# Patient Record
Sex: MALE | Race: WHITE | NOT HISPANIC OR LATINO | Employment: OTHER | URBAN - METROPOLITAN AREA
[De-identification: names, ages, dates, MRNs, and addresses within clinical notes are randomized per-mention and may not be internally consistent; named-entity substitution may affect disease eponyms.]

---

## 2017-08-28 ENCOUNTER — HOSPITAL ENCOUNTER (OUTPATIENT)
Dept: OTHER | Facility: HOSPITAL | Age: 42
Setting detail: SPECIMEN
Discharge: HOME | End: 2017-08-28
Attending: UROLOGY | Admitting: UROLOGY

## 2017-08-29 LAB — SPECIMEN SOURCE: NORMAL

## 2019-03-07 ENCOUNTER — HOSPITAL ENCOUNTER (OUTPATIENT)
Dept: OTHER | Facility: HOSPITAL | Age: 44
Setting detail: SPECIMEN
Discharge: HOME | End: 2019-03-07
Attending: UROLOGY | Admitting: UROLOGY

## 2019-03-08 LAB — SPECIMEN SOURCE: NORMAL

## 2019-08-05 ENCOUNTER — TRANSCRIBE ORDERS (OUTPATIENT)
Dept: ADMINISTRATIVE | Facility: HOSPITAL | Age: 44
End: 2019-08-05

## 2019-08-05 DIAGNOSIS — N63.0 BREAST NODULE: Primary | ICD-10-CM

## 2019-08-09 ENCOUNTER — HOSPITAL ENCOUNTER (OUTPATIENT)
Dept: ULTRASOUND IMAGING | Facility: HOSPITAL | Age: 44
Discharge: HOME | End: 2019-08-09

## 2019-08-09 ENCOUNTER — HOSPITAL ENCOUNTER (OUTPATIENT)
Dept: MAMMOGRAPHY | Facility: HOSPITAL | Age: 44
Discharge: HOME | End: 2019-08-09
Admitting: FAMILY MEDICINE

## 2019-08-09 DIAGNOSIS — N63.0 BREAST NODULE: ICD-10-CM

## 2019-08-09 PROCEDURE — 76642 ULTRASOUND BREAST LIMITED: CPT

## 2019-08-09 PROCEDURE — 77066 DX MAMMO INCL CAD BI: CPT

## 2019-08-13 ENCOUNTER — TELEPHONE (OUTPATIENT)
Dept: SURGERY | Facility: CLINIC | Age: 44
End: 2019-08-13

## 2020-03-03 ENCOUNTER — CONSULT (OUTPATIENT)
Dept: ONCOLOGY | Facility: CLINIC | Age: 45
End: 2020-03-03

## 2020-03-03 ENCOUNTER — APPOINTMENT (OUTPATIENT)
Dept: LAB | Facility: HOSPITAL | Age: 45
End: 2020-03-03

## 2020-03-03 VITALS
HEART RATE: 80 BPM | RESPIRATION RATE: 16 BRPM | DIASTOLIC BLOOD PRESSURE: 60 MMHG | WEIGHT: 174.8 LBS | SYSTOLIC BLOOD PRESSURE: 92 MMHG | HEIGHT: 69 IN | OXYGEN SATURATION: 98 % | BODY MASS INDEX: 25.89 KG/M2 | TEMPERATURE: 98.3 F

## 2020-03-03 DIAGNOSIS — D64.9 ANEMIA, UNSPECIFIED TYPE: ICD-10-CM

## 2020-03-03 DIAGNOSIS — K74.60 HEPATIC CIRRHOSIS, UNSPECIFIED HEPATIC CIRRHOSIS TYPE, UNSPECIFIED WHETHER ASCITES PRESENT (HCC): ICD-10-CM

## 2020-03-03 DIAGNOSIS — D69.6 THROMBOCYTOPENIA (HCC): Primary | ICD-10-CM

## 2020-03-03 LAB
ALBUMIN SERPL-MCNC: 4.2 G/DL (ref 3.5–5.2)
ALBUMIN/GLOB SERPL: 1.5 G/DL (ref 1.1–2.4)
ALP SERPL-CCNC: 115 U/L (ref 38–116)
ALT SERPL W P-5'-P-CCNC: 32 U/L (ref 0–41)
ANION GAP SERPL CALCULATED.3IONS-SCNC: 15.3 MMOL/L (ref 5–15)
AST SERPL-CCNC: 35 U/L (ref 0–40)
BASOPHILS # BLD AUTO: 0.01 10*3/MM3 (ref 0–0.2)
BASOPHILS NFR BLD AUTO: 0.2 % (ref 0–1.5)
BILIRUB SERPL-MCNC: 1.2 MG/DL (ref 0.2–1.2)
BUN BLD-MCNC: 10 MG/DL (ref 6–20)
BUN/CREAT SERPL: 10.5 (ref 7.3–30)
CALCIUM SPEC-SCNC: 8.8 MG/DL (ref 8.5–10.2)
CHLORIDE SERPL-SCNC: 100 MMOL/L (ref 98–107)
CO2 SERPL-SCNC: 26.7 MMOL/L (ref 22–29)
CREAT BLD-MCNC: 0.95 MG/DL (ref 0.7–1.3)
DEPRECATED RDW RBC AUTO: 55.2 FL (ref 37–54)
EOSINOPHIL # BLD AUTO: 0.1 10*3/MM3 (ref 0–0.4)
EOSINOPHIL NFR BLD AUTO: 2 % (ref 0.3–6.2)
ERYTHROCYTE [DISTWIDTH] IN BLOOD BY AUTOMATED COUNT: 14.3 % (ref 12.3–15.4)
FERRITIN SERPL-MCNC: 52.3 NG/ML (ref 30–400)
FOLATE SERPL-MCNC: 19.2 NG/ML (ref 4.78–24.2)
GFR SERPL CREATININE-BSD FRML MDRD: 86 ML/MIN/1.73
GLOBULIN UR ELPH-MCNC: 2.8 GM/DL (ref 1.8–3.5)
GLUCOSE BLD-MCNC: 111 MG/DL (ref 74–124)
HAPTOGLOB SERPL-MCNC: 48 MG/DL (ref 30–200)
HCT VFR BLD AUTO: 41.7 % (ref 37.5–51)
HGB BLD-MCNC: 13.7 G/DL (ref 13–17.7)
IMM GRANULOCYTES # BLD AUTO: 0.02 10*3/MM3 (ref 0–0.05)
IMM GRANULOCYTES NFR BLD AUTO: 0.4 % (ref 0–0.5)
IRON 24H UR-MRATE: 152 MCG/DL (ref 59–158)
IRON SATN MFR SERPL: 37 % (ref 14–48)
LDH SERPL-CCNC: 178 U/L (ref 99–259)
LYMPHOCYTES # BLD AUTO: 0.59 10*3/MM3 (ref 0.7–3.1)
LYMPHOCYTES NFR BLD AUTO: 11.8 % (ref 19.6–45.3)
MCH RBC QN AUTO: 34.5 PG (ref 26.6–33)
MCHC RBC AUTO-ENTMCNC: 32.9 G/DL (ref 31.5–35.7)
MCV RBC AUTO: 105 FL (ref 79–97)
MONOCYTES # BLD AUTO: 0.68 10*3/MM3 (ref 0.1–0.9)
MONOCYTES NFR BLD AUTO: 13.6 % (ref 5–12)
NEUTROPHILS # BLD AUTO: 3.61 10*3/MM3 (ref 1.7–7)
NEUTROPHILS NFR BLD AUTO: 72 % (ref 42.7–76)
NRBC BLD AUTO-RTO: 0 /100 WBC (ref 0–0.2)
PLATELET # BLD AUTO: 69 10*3/MM3 (ref 140–450)
PLATELETS.RETICULATED NFR BLD AUTO: 5 % (ref 0.9–6.5)
PMV BLD AUTO: 10.1 FL (ref 6–12)
POTASSIUM BLD-SCNC: 3.4 MMOL/L (ref 3.5–4.7)
PROT SERPL-MCNC: 7 G/DL (ref 6.3–8)
RBC # BLD AUTO: 3.97 10*6/MM3 (ref 4.14–5.8)
SODIUM BLD-SCNC: 142 MMOL/L (ref 134–145)
TIBC SERPL-MCNC: 407 MCG/DL (ref 249–505)
TRANSFERRIN SERPL-MCNC: 291 MG/DL (ref 200–360)
VIT B12 BLD-MCNC: 671 PG/ML (ref 211–946)
WBC NRBC COR # BLD: 5.01 10*3/MM3 (ref 3.4–10.8)

## 2020-03-03 PROCEDURE — 82728 ASSAY OF FERRITIN: CPT | Performed by: INTERNAL MEDICINE

## 2020-03-03 PROCEDURE — 85025 COMPLETE CBC W/AUTO DIFF WBC: CPT | Performed by: INTERNAL MEDICINE

## 2020-03-03 PROCEDURE — 80053 COMPREHEN METABOLIC PANEL: CPT | Performed by: INTERNAL MEDICINE

## 2020-03-03 PROCEDURE — 82746 ASSAY OF FOLIC ACID SERUM: CPT | Performed by: INTERNAL MEDICINE

## 2020-03-03 PROCEDURE — 83010 ASSAY OF HAPTOGLOBIN QUANT: CPT | Performed by: INTERNAL MEDICINE

## 2020-03-03 PROCEDURE — 82607 VITAMIN B-12: CPT | Performed by: INTERNAL MEDICINE

## 2020-03-03 PROCEDURE — 99205 OFFICE O/P NEW HI 60 MIN: CPT | Performed by: INTERNAL MEDICINE

## 2020-03-03 PROCEDURE — 83615 LACTATE (LD) (LDH) ENZYME: CPT | Performed by: INTERNAL MEDICINE

## 2020-03-03 PROCEDURE — 85055 RETICULATED PLATELET ASSAY: CPT | Performed by: INTERNAL MEDICINE

## 2020-03-03 PROCEDURE — 84466 ASSAY OF TRANSFERRIN: CPT | Performed by: INTERNAL MEDICINE

## 2020-03-03 PROCEDURE — 83540 ASSAY OF IRON: CPT | Performed by: INTERNAL MEDICINE

## 2020-03-03 PROCEDURE — 36415 COLL VENOUS BLD VENIPUNCTURE: CPT | Performed by: INTERNAL MEDICINE

## 2020-03-03 RX ORDER — SPIRONOLACTONE 100 MG/1
100 TABLET, FILM COATED ORAL DAILY
COMMUNITY
Start: 2020-01-14

## 2020-03-03 RX ORDER — FUROSEMIDE 40 MG/1
40 TABLET ORAL DAILY
COMMUNITY
Start: 2020-02-19 | End: 2022-03-08 | Stop reason: DRUGHIGH

## 2020-03-03 RX ORDER — BUPRENORPHINE HYDROCHLORIDE AND NALOXONE HYDROCHLORIDE DIHYDRATE 8; 2 MG/1; MG/1
2 TABLET SUBLINGUAL DAILY
COMMUNITY
Start: 2019-06-06 | End: 2020-09-08

## 2020-03-04 LAB
IGA SERPL-MCNC: 360 MG/DL (ref 90–386)
IGG SERPL-MCNC: 1040 MG/DL (ref 700–1600)
IGM SERPL-MCNC: 115 MG/DL (ref 20–172)
PROT PATTERN SERPL IFE-IMP: NORMAL

## 2020-03-09 ENCOUNTER — APPOINTMENT (OUTPATIENT)
Dept: LAB | Facility: HOSPITAL | Age: 45
End: 2020-03-09

## 2020-03-09 ENCOUNTER — OFFICE VISIT (OUTPATIENT)
Dept: ONCOLOGY | Facility: CLINIC | Age: 45
End: 2020-03-09

## 2020-03-09 VITALS
HEART RATE: 77 BPM | RESPIRATION RATE: 16 BRPM | BODY MASS INDEX: 26.22 KG/M2 | SYSTOLIC BLOOD PRESSURE: 94 MMHG | OXYGEN SATURATION: 99 % | DIASTOLIC BLOOD PRESSURE: 69 MMHG | HEIGHT: 69 IN | WEIGHT: 177 LBS | TEMPERATURE: 98 F

## 2020-03-09 DIAGNOSIS — D69.6 THROMBOCYTOPENIA (HCC): Primary | ICD-10-CM

## 2020-03-09 DIAGNOSIS — K74.60 HEPATIC CIRRHOSIS, UNSPECIFIED HEPATIC CIRRHOSIS TYPE, UNSPECIFIED WHETHER ASCITES PRESENT (HCC): ICD-10-CM

## 2020-03-09 DIAGNOSIS — D64.9 ANEMIA, UNSPECIFIED TYPE: ICD-10-CM

## 2020-03-09 PROCEDURE — 99214 OFFICE O/P EST MOD 30 MIN: CPT | Performed by: INTERNAL MEDICINE

## 2020-03-09 NOTE — PROGRESS NOTES
"    .     REASON FOR CONSULTATION:     Thrombocytopenia secondary to liver cirrhosis.                                  HISTORY OF PRESENT ILLNESS:  The patient is a 44 y.o. male with history presented today for reevaluation, to discuss laboratory results obtained on 3/3/2020 for evaluation of his thrombocytopenia.  Patient reports that at baseline condition denies easy bleeding or bruising.  Patient continues to have chronic fatigue, poor appetite.  His abdomen discomfort.  Denies abdomen distention, no lower extremity edema.  Performance that is ECOG 1-0.    Laboratory study on 3/3/2020 reported platelets 69,000, IPF 5%, hemoglobin 13.7 .0 and WBC 5010 including ANC 3610 lymphocytes 590.  Other labs reported a normal , normal liver function panel, total protein 7.0 and albumin 4.2 globulin 2.8.  Normal renal function and glucose level.  Marginally low potassium 3.4 otherwise normal electrolytes.  Serum protein immunofixation was negative for monoclonal protein.  Vitamin B12 level 671 pg/mL, folate 19.2, ferritin 52.3 free iron 152 TIBC 407 and iron saturation 37%, haptoglobin 48.        Past Medical History:   Diagnosis Date   • Anxiety    • Breast asymmetry 08/2019    Right lump   • Esophageal varix (CMS/HCC)     Multiple bandings   • GERD (gastroesophageal reflux disease)    • History of cirrhosis     Stage III/Dr. Giraldo [facility]   • Hypotestosteronism    • Infectious viral hepatitis     C   • Jaundice    • Substance abuse (CMS/HCC)     H/O drug use, sees substance abuse doctor     Past Surgical History:   Procedure Laterality Date   • APPENDECTOMY     • BRAIN SURGERY      20 years ago \"drained fluid pocket\"?   • ESOPHAGUS SURGERY      Banding more than 10 times   • SHOULDER ARTHROSCOPY Right 1995    Rotator cuff     ONCOLOGIC/HEMATOLOGIC HISTORY: The patient is a 44 y.o. male with history as below who is here on 3/3/2020 for initial evaluation of thrombocytopenia.  The patient presents by himself " today.      Patient reports that he is aware of having low platelet counts for many years.      He has history of GI bleeding due to esophageal varices.  The patient had an EGD done by Dr. Nikkie Giraldo on 4/15/2019, which revealed grade I esophageal varices, but no evidence of malignancy.  He reports he has had about 10 esophageal banding procedures done, and his most recent esophageal banding was performed about 3 weeks ago at [facility].  He notes he has the esophageal banding procedure repeated about every 3 months.  He denies any recent melena or hematochezia.  He reports occasional abdominal distention and abdominal discomfort, but he denies any significant abdominal pain.  Patient reports that he had a previous ascites, but no paracentesis.  He was given diuretics, he also become more physically active and exercise in gym which made his situation better.    He reports easy bruising with blood draws, but otherwise denies abnormal bleeding or bruising.  He denies any active bleeding at this time.    The patient was diagnosed with cirrhosis of the liver about 10 years ago due to Hepatitis C from illicit drug use.  I reviewed the CT scan of the abdomen and pelvis from 2/29/2016, which demonstrated cirrhosis and splenomegaly 15 cm.   He notes that his Hepatitis C is cured with the treatment, and he is no longer using illicit drugs.  He denies any history of heavy alcohol use and denies any alcohol use at this time.    He denies any family history of cancer or blood disorders.  The patient currently works as a .    Per our records, the patient has had a low platelet count since 1/16/2012.  He was previously followed by hematology service Dr. Dye at the [facility] from 3/19/2012 for thrombocytopenia and liver cirrhosis.  It seems patient also had portal vein thrombosis and was on Coumadin anticoagulation at that time.  According to the clinic note, he had platelets 33,000 in 2010.    Patient had a  hospitalization at the [facility] in late August 2019 due to left femur fracture for which he had ORIF.  Laboratory study on 8/24/2019 reported a platelets 66,000, hemoglobin 13.0 MCV 98 and WBC 8600 including ANC 5970, lymphocytes 1310 and monocytes 1200.  His platelets dropped at 39,000 on 8/25/2019 with Hb 12.4, MCV 99, WBC 5100 and without differentiation.  Platelets improved at 47,000 on 8/26/2019 and hemoglobin 11.7 WBC 5100 without differentiation.    Outside laboratory study from 1/16/2013 reported hemoglobin 15.3, .1, platelets 96,000, ANC 4400, with WBC 7300.    Laboratory study on 8/9/2019 reported WBC 5900, ANC 4213, hemoglobin 13.6 hematocrit 40.5, .7 MCHC 33.6, and platelets 55,000.  Lymphocytes was 897, monocytes 696.  His vitamin B12 level was 601 pg/mL, negative PSA less than 0.1, TSH normal at 0.90, and vitamin D 25 ng/mL and normal lipid profile.  Chemistry lab reported normal creatinine 0.83 normal electrolytes, normal liver function panel but a slightly elevated alk phosphatase 142.    Laboratory studies today, 3/3/2020, show hemoglobin 13.7 .0 MCHC 32.9, platelets 69,000 IPF 5%, WBC 5010 including ANC 3610 lymphocytes 1530 lymphocytes 590 and monocytes 680.       MEDICATIONS    Current Outpatient Medications:   •  buprenorphine-naloxone (SUBOXONE) 8-2 MG per SL tablet, Dissolve 2 tablets under tongue daily, Disp: , Rfl:   •  furosemide (LASIX) 40 MG tablet, TK 1 T PO  QAM, Disp: , Rfl:   •  spironolactone (ALDACTONE) 100 MG tablet, TK 1 T PO  QAM, Disp: , Rfl:     ALLERGIES:   No Known Allergies    SOCIAL HISTORY:       Social History     Socioeconomic History   • Marital status:      Spouse name: Not on file   • Number of children: Not on file   • Years of education: College   • Highest education level: Not on file   Occupational History   • Occupation: [employer]     Employer: DISABLED     Comment: [id number]   Tobacco Use   • Smoking  "status: Current Every Day Smoker     Packs/day: 1.00     Years: 25.00     Pack years: 25.00     Types: Cigarettes   Substance and Sexual Activity   • Alcohol use: Yes     Comment: occasional   • Drug use: Yes         FAMILY HISTORY:  Family History   Problem Relation Age of Onset   • Coronary artery disease Father        REVIEW OF SYSTEMS:  Review of Systems   Constitutional: Positive for appetite change (poor appetite), fatigue and unexpected weight change (weight fluctuates). Negative for chills, diaphoresis and fever.   HENT: Negative for hearing loss, nosebleeds, sore throat and tinnitus.    Eyes: Negative for pain and visual disturbance.   Respiratory: Negative for cough, shortness of breath and wheezing.    Cardiovascular: Negative for chest pain and palpitations.   Gastrointestinal: Positive for abdominal distention (occasional, not a problem currently), abdominal pain (discomfort), nausea and vomiting. Negative for blood in stool and diarrhea.        Chronic indigestion   Endocrine: Positive for cold intolerance, polydipsia and polyuria. Negative for heat intolerance.   Genitourinary: Negative for difficulty urinating, dysuria and hematuria.   Musculoskeletal: Positive for arthralgias. Negative for joint swelling and myalgias.   Skin: Positive for rash. Negative for wound.   Allergic/Immunologic: Negative for environmental allergies.   Neurological: Positive for syncope, numbness and headaches. Negative for dizziness.   Hematological: Negative for adenopathy. Bruises/bleeds easily (easy bruising with blood draws).   Psychiatric/Behavioral: Negative for dysphoric mood and sleep disturbance. The patient is not nervous/anxious.               Vitals:    03/09/20 1138   BP: 94/69   Pulse: 77   Resp: 16   Temp: 98 °F (36.7 °C)   SpO2: 99%   Weight: 80.3 kg (177 lb)   Height: 176 cm (69.29\")   PainSc: 0-No pain     Current Status 3/9/2020   ECOG score 0        PHYSICAL EXAM:    CONSTITUTIONAL:  Vital signs " reviewed.  Well-developed, well-nourished male in no distress, looks comfortable.    EYES:  Conjunctiva and lids unremarkable.  Pupils equal and round.  EARS,NOSE,MOUTH,THROAT:  Nose appear unremarkable.  Patient has full-mouth dentures.  RESPIRATORY:  Normal respiratory effort.  Lungs clear to auscultation bilaterally.  CARDIOVASCULAR: Regular rhythm and normal rate.  Normal S1, S2.  No murmurs rubs or gallops.    GASTROINTESTINAL: Abdomen no tender no distention.  No hepatomegaly.  No splenomegaly.  Bowel sounds normal.  LYMPHATIC:  No cervical lymphadenopathy.  MUSCULOSKELETAL:  Unremarkable gait and station.  No cyanosis or clubbing.  No leg edema.  SKIN:  Skin appears gray.  Warm.  No rashes.  PSYCHIATRIC:  Normal judgment and insight.  Normal mood and affect.    RECENT LABS:  Lab Results   Component Value Date    WBC 5.01 03/03/2020    HGB 13.7 03/03/2020    HCT 41.7 03/03/2020    .0 (H) 03/03/2020    PLT 69 (L) 03/03/2020     Lab Results   Component Value Date    NEUTROABS 3.61 03/03/2020     Lab Results   Component Value Date    KCESNXSE34 671 03/03/2020     Lab Results   Component Value Date    FOLATE 19.20 03/03/2020     Lab Results   Component Value Date    IRON 152 03/03/2020    TIBC 407 03/03/2020    FERRITIN 52.30 03/03/2020     Glucose   Date Value Ref Range Status   03/03/2020 111 74 - 124 mg/dL Final     BUN   Date Value Ref Range Status   03/03/2020 10 6 - 20 mg/dL Final     Creatinine   Date Value Ref Range Status   03/03/2020 0.95 0.70 - 1.30 mg/dL Final     Sodium   Date Value Ref Range Status   03/03/2020 142 134 - 145 mmol/L Final     Potassium   Date Value Ref Range Status   03/03/2020 3.4 (L) 3.5 - 4.7 mmol/L Final     Chloride   Date Value Ref Range Status   03/03/2020 100 98 - 107 mmol/L Final     CO2   Date Value Ref Range Status   03/03/2020 26.7 22.0 - 29.0 mmol/L Final     Calcium   Date Value Ref Range Status   03/03/2020 8.8 8.5 - 10.2 mg/dL Final     Total Protein   Date  Value Ref Range Status   03/03/2020 7.0 6.3 - 8.0 g/dL Final     Albumin   Date Value Ref Range Status   03/03/2020 4.20 3.50 - 5.20 g/dL Final     ALT (SGPT)   Date Value Ref Range Status   03/03/2020 32 0 - 41 U/L Final     AST (SGOT)   Date Value Ref Range Status   03/03/2020 35 0 - 40 U/L Final     Alkaline Phosphatase   Date Value Ref Range Status   03/03/2020 115 38 - 116 U/L Final     Total Bilirubin   Date Value Ref Range Status   03/03/2020 1.2 0.2 - 1.2 mg/dL Final     eGFR Non  Amer   Date Value Ref Range Status   03/03/2020 86 >60 mL/min/1.73 Final     BUN/Creatinine Ratio   Date Value Ref Range Status   03/03/2020 10.5 7.3 - 30.0 Final     Anion Gap   Date Value Ref Range Status   03/03/2020 15.3 (H) 5.0 - 15.0 mmol/L Final       Assessment/Plan     ASSESSMENT:  1.  Thrombocytopenia secondary to liver cirrhosis.  Patient had hepatitis C which was cured according to patient.    · Laboratory studies documented thrombocytopenia dating back at least to 2010.     · He reports easy bruising with blood draws, but otherwise denies abnormal bleeding or bruising.  He denies any active bleeding at this time.     · Laboratory studies 3/3/2020, show platelets 66,000.  Further laboratory study and review of peripheral blood smear showed no evidence of increased young platelets, no evidence of destroying platelets in the vasculature. Most likely his thrombocytopenia is caused by his liver cirrhosis.   · Patient had a mediocre vitamin B12 level.  I do advise patient to try oral vitamin B12 at 1000 mcg to see if it helps.  His B12 level was normal however could be pushed too much high level and vitamin B12 treatment is benign without side effects and very cheap to use.  He voiced understanding and I will by over-the-counter product.    2.  Cirrhosis of the liver.   The patient was diagnosed with cirrhosis of the liver about 10 years ago due to Hepatitis C from illicit drug use.  I reviewed the CT scan of the  abdomen and pelvis from 2/29/2016, which demonstrated cirrhosis and splenomegaly.     · He notes that his Hepatitis C is cured, and he is no longer using illicit drugs.    3.  Mild macrocytic anemia.    · He has history of GI bleeding due to esophageal varices.  He reports he has had about 10 esophageal banding procedures done, repeated about every 3 months.  He denies any recent abnormal bleeding.  · Laboratory studies today, 3/3/2020, show hemoglobin 13.7.  Laboratory study and peripheral blood smear showed no evidence of hemolysis.    · He had a mediocre vitamin B12 level, and also suboptimal ferritin level despite normal iron saturation.  His suboptimal ferritin level could be related to previous GI bleeding from esophageal varices.  · He is mild macrocytosis is likely also related to liver cirrhosis.  · I discussed with the patient today, I think oral iron could potentially improve his hemoglobin.  Asked him to try oral iron supplementation once a day.  I cautioned him could have caused constipation and abdomen discomfort.  He voiced understanding..      PLAN:  1. Start oral vitamin B12 at 1000 mcg daily.    2. Start oral ferrous sulfate 325 mg daily, equivalent to elementary iron 65 mg.    3. We will bring patient back in 6 months for reevaluation, we will repeat labs CBC IPF, ferritin B12 and folate.    Discussed with the patient today for laboratory results and the management.  He voiced understanding.    More than 25 min were used for patient care, over half of that time were for counseling.    GANESH DAWN M.D., Ph.D.        CC: Casa Owen MD, [place], [place].

## 2020-04-21 ENCOUNTER — TELEPHONE (OUTPATIENT)
Dept: ONCOLOGY | Facility: CLINIC | Age: 45
End: 2020-04-21

## 2020-06-23 ENCOUNTER — OFFICE VISIT (OUTPATIENT)
Dept: SURGERY | Facility: CLINIC | Age: 45
End: 2020-06-23

## 2020-06-23 VITALS
DIASTOLIC BLOOD PRESSURE: 66 MMHG | HEIGHT: 70 IN | WEIGHT: 160 LBS | SYSTOLIC BLOOD PRESSURE: 118 MMHG | BODY MASS INDEX: 22.9 KG/M2 | RESPIRATION RATE: 18 BRPM

## 2020-06-23 DIAGNOSIS — K42.9 UMBILICAL HERNIA WITHOUT OBSTRUCTION AND WITHOUT GANGRENE: Primary | ICD-10-CM

## 2020-06-23 PROCEDURE — 99203 OFFICE O/P NEW LOW 30 MIN: CPT | Performed by: SURGERY

## 2020-06-23 RX ORDER — OMEPRAZOLE 20 MG/1
20 CAPSULE, DELAYED RELEASE ORAL DAILY
COMMUNITY

## 2020-07-14 ENCOUNTER — LAB REQUISITION (OUTPATIENT)
Dept: LAB | Facility: HOSPITAL | Age: 45
End: 2020-07-14

## 2020-07-14 DIAGNOSIS — K42.9 UMBILICAL HERNIA WITHOUT OBSTRUCTION AND WITHOUT GANGRENE: Primary | ICD-10-CM

## 2020-07-14 DIAGNOSIS — Z00.00 ENCOUNTER FOR GENERAL ADULT MEDICAL EXAMINATION WITHOUT ABNORMAL FINDINGS: ICD-10-CM

## 2020-07-14 RX ORDER — SODIUM CHLORIDE 9 MG/ML
100 INJECTION, SOLUTION INTRAVENOUS CONTINUOUS
Status: CANCELLED | OUTPATIENT
Start: 2020-07-14

## 2020-08-06 ENCOUNTER — APPOINTMENT (OUTPATIENT)
Dept: PREADMISSION TESTING | Facility: HOSPITAL | Age: 45
End: 2020-08-06

## 2020-08-07 ENCOUNTER — APPOINTMENT (OUTPATIENT)
Dept: PREADMISSION TESTING | Facility: HOSPITAL | Age: 45
End: 2020-08-07

## 2020-08-07 VITALS
HEART RATE: 68 BPM | OXYGEN SATURATION: 98 % | WEIGHT: 158.9 LBS | BODY MASS INDEX: 22.75 KG/M2 | HEIGHT: 70 IN | DIASTOLIC BLOOD PRESSURE: 69 MMHG | SYSTOLIC BLOOD PRESSURE: 99 MMHG | RESPIRATION RATE: 16 BRPM

## 2020-08-07 LAB
ALBUMIN SERPL-MCNC: 3.9 G/DL (ref 3.5–5.2)
ALBUMIN/GLOB SERPL: 1.4 G/DL
ALP SERPL-CCNC: 149 U/L (ref 39–117)
ALT SERPL W P-5'-P-CCNC: 43 U/L (ref 1–41)
ANION GAP SERPL CALCULATED.3IONS-SCNC: 8.9 MMOL/L (ref 5–15)
AST SERPL-CCNC: 44 U/L (ref 1–40)
BILIRUB SERPL-MCNC: 0.9 MG/DL (ref 0–1.2)
BUN SERPL-MCNC: 13 MG/DL (ref 6–20)
BUN/CREAT SERPL: 16.7 (ref 7–25)
CALCIUM SPEC-SCNC: 9 MG/DL (ref 8.6–10.5)
CHLORIDE SERPL-SCNC: 101 MMOL/L (ref 98–107)
CO2 SERPL-SCNC: 29.1 MMOL/L (ref 22–29)
CREAT SERPL-MCNC: 0.78 MG/DL (ref 0.76–1.27)
DEPRECATED RDW RBC AUTO: 56.1 FL (ref 37–54)
ERYTHROCYTE [DISTWIDTH] IN BLOOD BY AUTOMATED COUNT: 14.2 % (ref 12.3–15.4)
GFR SERPL CREATININE-BSD FRML MDRD: 107 ML/MIN/1.73
GLOBULIN UR ELPH-MCNC: 2.8 GM/DL
GLUCOSE SERPL-MCNC: 70 MG/DL (ref 65–99)
HCT VFR BLD AUTO: 36.3 % (ref 37.5–51)
HGB BLD-MCNC: 11.9 G/DL (ref 13–17.7)
MCH RBC QN AUTO: 34.7 PG (ref 26.6–33)
MCHC RBC AUTO-ENTMCNC: 32.8 G/DL (ref 31.5–35.7)
MCV RBC AUTO: 105.8 FL (ref 79–97)
PLATELET # BLD AUTO: 69 10*3/MM3 (ref 140–450)
PMV BLD AUTO: 10.1 FL (ref 6–12)
POTASSIUM SERPL-SCNC: 4.2 MMOL/L (ref 3.5–5.2)
PROT SERPL-MCNC: 6.7 G/DL (ref 6–8.5)
RBC # BLD AUTO: 3.43 10*6/MM3 (ref 4.14–5.8)
SODIUM SERPL-SCNC: 139 MMOL/L (ref 136–145)
WBC # BLD AUTO: 5.24 10*3/MM3 (ref 3.4–10.8)

## 2020-08-07 PROCEDURE — 80053 COMPREHEN METABOLIC PANEL: CPT | Performed by: SURGERY

## 2020-08-07 PROCEDURE — 85027 COMPLETE CBC AUTOMATED: CPT | Performed by: SURGERY

## 2020-08-07 PROCEDURE — 36415 COLL VENOUS BLD VENIPUNCTURE: CPT

## 2020-08-07 NOTE — DISCHARGE INSTRUCTIONS
PRE-ADMISSION TESTING INSTRUCTIONS FOR ADULTS    Take these medications the morning of surgery with a small sip of water:  suboxone and omeprazole      No aspirin, advil, aleve, ibuprofen, naproxen, diet pills, decongestants, or herbal/vitamins for a week prior to surgery.    General Instructions:    • Do not eat solid food after midnight the night before surgery.  No gum, mints, or hard candy after midnight the night before surgery.  • You may drink clear liquids the day of surgery up until 2 hours before your arrival time.  • Clear liquids are liquids you can see through. Nothing RED in color.    Plain water    Sports drinks  Sodas     Gelatin (Jell-O)  Fruit juices without pulp such as white grape juice and apple juice  Popsicles that contain no fruit or yogurt  Tea or coffee (no cream or milk added)    • It is beneficial for you to have a clear drink that contains carbohydrates just before you leave your house and before your fasting time begins.  We suggest a 20 ounce bottle of Gatorade or Powerade for non-diabetic patients or a 20 ounce bottle of G2 or Powerade Zero for diabetic patients.     • Patients who avoid smoking, chewing tobacco and alcohol for 4 weeks prior to surgery have a reduced risk of post-operative complications.  If at all possible, quit smoking as many days before surgery as you can.    • Do not smoke, use chewing tobacco or drink alcohol the day of surgery    • Bring your C-PAP/ BI-PAP machine if you use one.  • Wear clean comfortable clothes and socks.  • Do not wear contact lenses, lotion, deodorant, or make-up.  Bring a case for your glasses if applicable. You may brush your teeth the morning of surgery.  • You may wear dentures/partials, do not put adhesive/glue on them.  • Bring crutches or walker if applicable.  • Leave all other jewelry and valuables at home.      Preventing a Surgical Site Infection:    • Shower the night before and on the morning of surgery using the chlorhexidine  soap you were given.  Use a clean washcloth with the soap.  Place clean sheets on your bed after showering the night before surgery. Do not use the CHG soap on your hair, face, or private areas. Wash your body gently for five (5) minutes. Do not scrub your skin.  Dry with a clean towel and dress in clean clothing.    • Do not shave the surgical area for 10 days-2 weeks prior to surgery  because the razor can irritate skin and make it easier to develop an infection.  • Make sure you, your family, and all healthcare providers clean their hands with soap and water or an alcohol based hand  before caring for you or your wound.      Day of surgery:    Your surgeon’s office will advise you of your arrival time for the day of surgery.    Upon arrival, a Pre-op nurse and Anesthesia provider will review your health history, obtain vital signs, and answer questions you may have.  The only belongings needed at this time will be your home medications and if applicable your C-PAP/BI-PAP machine.  If you are staying overnight your family can leave the rest of your belongings in the car and bring them to your room later.  A Pre-op nurse will start an IV and you may receive medication in preparation for surgery, including something to help you relax.  Your family will be able to see you in the Pre-op area.  While you are in surgery your family should notify the waiting room  if they leave the waiting room area and provide a contact phone number.    IF you have any questions, you can call the Pre-Admission Department at [phone] or your surgeon's office.  Notify your surgeon if  you become sick, have a fever, productive cough, or cannot be here the day of surgery    Please be aware that surgery does come with discomfort.  We want to make every effort to control your discomfort so please discuss any uncontrolled symptoms with your nurse.   Your doctor will most likely have prescribed pain medications.       If you are going home after surgery, you will receive individualized written care instructions before being discharged.  A responsible adult (over the age of 18) must drive you to and from the hospital on the day of your surgery and stay with you for 24 hours after anesthesia.    If you are staying overnight following surgery, you will be transported to your hospital room following the recovery period.  [facility] has all private rooms.    You may receive a survey regarding the care you received. Your feedback is very important and will be used to collect the necessary data to help us to continue to provide excellent care.     Deductibles and co-payments are collected on the day of service. Please be prepared to pay the required co-pay, deductible or deposit on the day of service as defined by your plan.    Umbilical Herniorrhaphy  Herniorrhaphy is surgery to repair a hernia. A hernia is the protrusion of a part of an organ through an abdominal opening. An umbilical hernia means that your hernia is in the area around your navel. If the hernia is not repaired, the gap could get bigger. Your intestines or other tissues, such as fat, could get trapped in the gap. This can lead to other health problems, such as blocked intestines. If the hernia is fixed before problems set in, you may be allowed to go home the same day as the surgery (outpatient).  LET YOUR HEALTH CARE PROVIDER KNOW ABOUT:  · Allergies to food or medicine.  · Medicines taken, including vitamins, herbs, eye drops, over-the-counter medicines, and creams.  · Use of steroids (by mouth or creams).  · Previous problems with anesthetics or numbing medicines.  · History of bleeding problems or blood clots.  · Previous surgery.  · Other health problems, including diabetes and kidney problems.  · Possibility of pregnancy, if this applies.  RISKS AND COMPLICATIONS  · Pain.  · Excessive bleeding.  · Hematoma. This is a pocket of blood that collects  under the surgery site.  · Infection at the surgery site.  · Numbness at the surgery site.  · Swelling and bruising.  · Blood clots.  · Intestinal damage (rare).  · Scarring.  · Skin damage.  · Development of another hernia. This may require another surgery.  BEFORE THE PROCEDURE  · Ask your health care provider about changing or stopping your regular medicines. You may need to stop taking aspirin, nonsteroidal anti-inflammatory drugs (NSAIDs), vitamin E, and blood thinners as early as 2 weeks before the procedure.  · Do not eat or drink for 8 hours before the procedure, or as directed by your health care provider.  · You might be asked to shower or wash with an antibacterial soap before the procedure.  · Wear comfortable clothes that will be easy to put on after the procedure.  PROCEDURE  You will be given an intravenous (IV) tube. A needle will be inserted in your arm. Medicine will flow directly into your body through this needle. You might be given medicine to help you relax (sedative). You will be given medicine that numbs the area (local anesthetic) or medicine that makes you sleep (general anesthetic).  If you have open surgery:  · The surgeon will make a cut (incision) in your abdomen.  · The gap in the muscle wall will be repaired. The surgeon may sew the edges together over the gap or use a mesh material to strengthen the area. When mesh is used, the body grows new, strong tissue into and around it. This new tissue closes the gap.  · The surgeon will close the incision.  If you have laparoscopic surgery:  · The surgeon will make several small incisions in your abdomen.  · A thin, lighted tube (laparoscope) will be inserted into the abdomen through an incision. A camera is attached to the laparoscope that allows the surgeon to see inside the abdomen.  · Tools will be inserted through the other incisions to repair the hernia. Usually, mesh is used to cover the gap.  · The surgeon will close the incisions  with stitches.  AFTER THE PROCEDURE  · You will be taken to a recovery area. A nurse will watch and check your progress.  · When you are awake, feeling well, and taking fluids well, you may be allowed to go home. In some cases, you may need to stay overnight in the hospital.  · Arrange for someone to drive you home.     This information is not intended to replace advice given to you by your health care provider. Make sure you discuss any questions you have with your health care provider.     Document Released: 03/16/2010 Document Revised: 01/08/2016 Document Reviewed: 03/20/2013  [employer] Interactive Patient Education ©2016 [employer]

## 2020-08-07 NOTE — PAT
Pt here for PAT visit.  Pre-op tests completed, chg soap given, and instructions reviewed.  Instructed clears until 2 hrs prior to arrival time and no smoking after midnight, voiced understanding.  COVID swab 8/11.  Will have anesthesia review chart.

## 2020-08-08 ENCOUNTER — TRANSCRIBE ORDERS (OUTPATIENT)
Dept: ADMINISTRATIVE | Facility: HOSPITAL | Age: 45
End: 2020-08-08

## 2020-08-08 DIAGNOSIS — Z01.818 PREOP EXAMINATION: Primary | ICD-10-CM

## 2020-08-10 ENCOUNTER — ANESTHESIA EVENT (OUTPATIENT)
Dept: PERIOP | Facility: HOSPITAL | Age: 45
End: 2020-08-10

## 2020-08-10 NOTE — PAT
Pt has banding of esophageal varix every 2 months.  Was scheduled for this procedure 8/14 but cancelled due to UHR procedure today.  Anesthesia should be aware of this day of surgery.  Recommend LMA and no NG/OG.  Pt has recent history of low platelets.

## 2020-08-11 ENCOUNTER — LAB (OUTPATIENT)
Dept: LAB | Facility: HOSPITAL | Age: 45
End: 2020-08-11

## 2020-08-11 DIAGNOSIS — Z01.818 PREOP EXAMINATION: ICD-10-CM

## 2020-08-11 PROCEDURE — C9803 HOPD COVID-19 SPEC COLLECT: HCPCS

## 2020-08-11 PROCEDURE — U0002 COVID-19 LAB TEST NON-CDC: HCPCS

## 2020-08-11 PROCEDURE — U0004 COV-19 TEST NON-CDC HGH THRU: HCPCS

## 2020-08-12 LAB
REF LAB TEST METHOD: NORMAL
SARS-COV-2 RNA RESP QL NAA+PROBE: NOT DETECTED

## 2020-08-13 ENCOUNTER — ANESTHESIA (OUTPATIENT)
Dept: PERIOP | Facility: HOSPITAL | Age: 45
End: 2020-08-13

## 2020-08-13 ENCOUNTER — HOSPITAL ENCOUNTER (OUTPATIENT)
Facility: HOSPITAL | Age: 45
Setting detail: HOSPITAL OUTPATIENT SURGERY
Discharge: HOME | End: 2020-08-13
Attending: SURGERY | Admitting: SURGERY

## 2020-08-13 VITALS
BODY MASS INDEX: 22.67 KG/M2 | RESPIRATION RATE: 16 BRPM | TEMPERATURE: 97.8 F | SYSTOLIC BLOOD PRESSURE: 117 MMHG | WEIGHT: 158 LBS | OXYGEN SATURATION: 98 % | DIASTOLIC BLOOD PRESSURE: 73 MMHG | HEART RATE: 95 BPM

## 2020-08-13 DIAGNOSIS — K42.9 UMBILICAL HERNIA WITHOUT OBSTRUCTION AND WITHOUT GANGRENE: ICD-10-CM

## 2020-08-13 LAB — POTASSIUM SERPL-SCNC: 3.7 MMOL/L (ref 3.5–5.2)

## 2020-08-13 PROCEDURE — 25010000002 DIPHENHYDRAMINE PER 50 MG: Performed by: NURSE ANESTHETIST, CERTIFIED REGISTERED

## 2020-08-13 PROCEDURE — 25010000002 FENTANYL CITRATE (PF) 100 MCG/2ML SOLUTION: Performed by: NURSE ANESTHETIST, CERTIFIED REGISTERED

## 2020-08-13 PROCEDURE — 25010000002 PROPOFOL 10 MG/ML EMULSION: Performed by: NURSE ANESTHETIST, CERTIFIED REGISTERED

## 2020-08-13 PROCEDURE — 25010000003 CEFAZOLIN SODIUM-DEXTROSE 2-3 GM-%(50ML) RECONSTITUTED SOLUTION: Performed by: SURGERY

## 2020-08-13 PROCEDURE — 84132 ASSAY OF SERUM POTASSIUM: CPT | Performed by: NURSE ANESTHETIST, CERTIFIED REGISTERED

## 2020-08-13 PROCEDURE — 25010000002 ONDANSETRON PER 1 MG: Performed by: NURSE ANESTHETIST, CERTIFIED REGISTERED

## 2020-08-13 PROCEDURE — S0260 H&P FOR SURGERY: HCPCS | Performed by: SURGERY

## 2020-08-13 PROCEDURE — 25010000002 DEXAMETHASONE PER 1 MG: Performed by: NURSE ANESTHETIST, CERTIFIED REGISTERED

## 2020-08-13 PROCEDURE — 49585 PR REPAIR UMBILICAL HERN,5+Y/O,REDUC: CPT | Performed by: SURGERY

## 2020-08-13 RX ORDER — DIPHENHYDRAMINE HYDROCHLORIDE 50 MG/ML
12.5 INJECTION INTRAMUSCULAR; INTRAVENOUS ONCE
Status: COMPLETED | OUTPATIENT
Start: 2020-08-13 | End: 2020-08-13

## 2020-08-13 RX ORDER — MIDAZOLAM HYDROCHLORIDE 2 MG/2ML
1 INJECTION, SOLUTION INTRAMUSCULAR; INTRAVENOUS
Status: DISCONTINUED | OUTPATIENT
Start: 2020-08-13 | End: 2020-08-13 | Stop reason: HOSPADM

## 2020-08-13 RX ORDER — CEFAZOLIN SODIUM 2 G/50ML
2 SOLUTION INTRAVENOUS ONCE
Status: COMPLETED | OUTPATIENT
Start: 2020-08-13 | End: 2020-08-13

## 2020-08-13 RX ORDER — DEXAMETHASONE SODIUM PHOSPHATE 4 MG/ML
8 INJECTION, SOLUTION INTRA-ARTICULAR; INTRALESIONAL; INTRAMUSCULAR; INTRAVENOUS; SOFT TISSUE ONCE AS NEEDED
Status: COMPLETED | OUTPATIENT
Start: 2020-08-13 | End: 2020-08-13

## 2020-08-13 RX ORDER — LIDOCAINE HYDROCHLORIDE 10 MG/ML
0.5 INJECTION, SOLUTION EPIDURAL; INFILTRATION; INTRACAUDAL; PERINEURAL ONCE AS NEEDED
Status: DISCONTINUED | OUTPATIENT
Start: 2020-08-13 | End: 2020-08-13 | Stop reason: HOSPADM

## 2020-08-13 RX ORDER — ACETAMINOPHEN 650 MG/1
650 SUPPOSITORY RECTAL ONCE AS NEEDED
Status: DISCONTINUED | OUTPATIENT
Start: 2020-08-13 | End: 2020-08-13 | Stop reason: HOSPADM

## 2020-08-13 RX ORDER — SODIUM CHLORIDE 9 MG/ML
40 INJECTION, SOLUTION INTRAVENOUS AS NEEDED
Status: DISCONTINUED | OUTPATIENT
Start: 2020-08-13 | End: 2020-08-13 | Stop reason: HOSPADM

## 2020-08-13 RX ORDER — HYDROMORPHONE HYDROCHLORIDE 1 MG/ML
0.5 INJECTION, SOLUTION INTRAMUSCULAR; INTRAVENOUS; SUBCUTANEOUS
Status: DISCONTINUED | OUTPATIENT
Start: 2020-08-13 | End: 2020-08-13 | Stop reason: HOSPADM

## 2020-08-13 RX ORDER — FAMOTIDINE 10 MG/ML
20 INJECTION, SOLUTION INTRAVENOUS
Status: COMPLETED | OUTPATIENT
Start: 2020-08-13 | End: 2020-08-13

## 2020-08-13 RX ORDER — KETAMINE HYDROCHLORIDE 100 MG/ML
INJECTION INTRAMUSCULAR; INTRAVENOUS AS NEEDED
Status: DISCONTINUED | OUTPATIENT
Start: 2020-08-13 | End: 2020-08-13 | Stop reason: SURG

## 2020-08-13 RX ORDER — BUPIVACAINE HYDROCHLORIDE AND EPINEPHRINE 2.5; 5 MG/ML; UG/ML
INJECTION, SOLUTION INFILTRATION; PERINEURAL AS NEEDED
Status: DISCONTINUED | OUTPATIENT
Start: 2020-08-13 | End: 2020-08-13 | Stop reason: HOSPADM

## 2020-08-13 RX ORDER — ACETAMINOPHEN 325 MG/1
650 TABLET ORAL ONCE AS NEEDED
Status: DISCONTINUED | OUTPATIENT
Start: 2020-08-13 | End: 2020-08-13 | Stop reason: HOSPADM

## 2020-08-13 RX ORDER — GLYCOPYRROLATE 0.2 MG/ML
INJECTION INTRAMUSCULAR; INTRAVENOUS AS NEEDED
Status: DISCONTINUED | OUTPATIENT
Start: 2020-08-13 | End: 2020-08-13 | Stop reason: SURG

## 2020-08-13 RX ORDER — OXYCODONE HYDROCHLORIDE AND ACETAMINOPHEN 5; 325 MG/1; MG/1
1 TABLET ORAL EVERY 4 HOURS PRN
Qty: 30 TABLET | Refills: 0 | Status: SHIPPED | OUTPATIENT
Start: 2020-08-13 | End: 2020-08-18

## 2020-08-13 RX ORDER — OXYCODONE HYDROCHLORIDE AND ACETAMINOPHEN 5; 325 MG/1; MG/1
1 TABLET ORAL ONCE AS NEEDED
Status: DISCONTINUED | OUTPATIENT
Start: 2020-08-13 | End: 2020-08-13 | Stop reason: HOSPADM

## 2020-08-13 RX ORDER — SODIUM CHLORIDE 0.9 % (FLUSH) 0.9 %
10 SYRINGE (ML) INJECTION EVERY 12 HOURS SCHEDULED
Status: DISCONTINUED | OUTPATIENT
Start: 2020-08-13 | End: 2020-08-13 | Stop reason: HOSPADM

## 2020-08-13 RX ORDER — SODIUM CHLORIDE 9 MG/ML
100 INJECTION, SOLUTION INTRAVENOUS CONTINUOUS
Status: DISCONTINUED | OUTPATIENT
Start: 2020-08-13 | End: 2020-08-13 | Stop reason: HOSPADM

## 2020-08-13 RX ORDER — DIPHENHYDRAMINE HYDROCHLORIDE 50 MG/ML
12.5 INJECTION INTRAMUSCULAR; INTRAVENOUS
Status: DISCONTINUED | OUTPATIENT
Start: 2020-08-13 | End: 2020-08-13 | Stop reason: HOSPADM

## 2020-08-13 RX ORDER — ONDANSETRON 2 MG/ML
4 INJECTION INTRAMUSCULAR; INTRAVENOUS ONCE AS NEEDED
Status: DISCONTINUED | OUTPATIENT
Start: 2020-08-13 | End: 2020-08-13 | Stop reason: HOSPADM

## 2020-08-13 RX ORDER — FENTANYL CITRATE 50 UG/ML
INJECTION, SOLUTION INTRAMUSCULAR; INTRAVENOUS AS NEEDED
Status: DISCONTINUED | OUTPATIENT
Start: 2020-08-13 | End: 2020-08-13 | Stop reason: SURG

## 2020-08-13 RX ORDER — SODIUM CHLORIDE 0.9 % (FLUSH) 0.9 %
10 SYRINGE (ML) INJECTION AS NEEDED
Status: DISCONTINUED | OUTPATIENT
Start: 2020-08-13 | End: 2020-08-13 | Stop reason: HOSPADM

## 2020-08-13 RX ORDER — HYDROMORPHONE HYDROCHLORIDE 1 MG/ML
1 INJECTION, SOLUTION INTRAMUSCULAR; INTRAVENOUS; SUBCUTANEOUS
Status: DISCONTINUED | OUTPATIENT
Start: 2020-08-13 | End: 2020-08-13 | Stop reason: HOSPADM

## 2020-08-13 RX ORDER — LIDOCAINE HYDROCHLORIDE 20 MG/ML
INJECTION, SOLUTION INFILTRATION; PERINEURAL AS NEEDED
Status: DISCONTINUED | OUTPATIENT
Start: 2020-08-13 | End: 2020-08-13 | Stop reason: SURG

## 2020-08-13 RX ORDER — MEPERIDINE HYDROCHLORIDE 25 MG/ML
12.5 INJECTION INTRAMUSCULAR; INTRAVENOUS; SUBCUTANEOUS
Status: DISCONTINUED | OUTPATIENT
Start: 2020-08-13 | End: 2020-08-13 | Stop reason: HOSPADM

## 2020-08-13 RX ORDER — ONDANSETRON 2 MG/ML
4 INJECTION INTRAMUSCULAR; INTRAVENOUS ONCE AS NEEDED
Status: COMPLETED | OUTPATIENT
Start: 2020-08-13 | End: 2020-08-13

## 2020-08-13 RX ORDER — MAGNESIUM HYDROXIDE 1200 MG/15ML
LIQUID ORAL AS NEEDED
Status: DISCONTINUED | OUTPATIENT
Start: 2020-08-13 | End: 2020-08-13 | Stop reason: HOSPADM

## 2020-08-13 RX ORDER — SODIUM CHLORIDE, SODIUM LACTATE, POTASSIUM CHLORIDE, CALCIUM CHLORIDE 600; 310; 30; 20 MG/100ML; MG/100ML; MG/100ML; MG/100ML
9 INJECTION, SOLUTION INTRAVENOUS CONTINUOUS
Status: DISCONTINUED | OUTPATIENT
Start: 2020-08-13 | End: 2020-08-13 | Stop reason: HOSPADM

## 2020-08-13 RX ORDER — PROPOFOL 10 MG/ML
VIAL (ML) INTRAVENOUS AS NEEDED
Status: DISCONTINUED | OUTPATIENT
Start: 2020-08-13 | End: 2020-08-13 | Stop reason: SURG

## 2020-08-13 RX ADMIN — GLYCOPYRROLATE 0.1 MG: 0.2 INJECTION INTRAMUSCULAR; INTRAVENOUS at 13:30

## 2020-08-13 RX ADMIN — KETAMINE HYDROCHLORIDE 10 MG: 100 INJECTION INTRAMUSCULAR; INTRAVENOUS at 13:43

## 2020-08-13 RX ADMIN — SODIUM CHLORIDE, POTASSIUM CHLORIDE, SODIUM LACTATE AND CALCIUM CHLORIDE: 600; 310; 30; 20 INJECTION, SOLUTION INTRAVENOUS at 13:27

## 2020-08-13 RX ADMIN — FAMOTIDINE 20 MG: 10 INJECTION, SOLUTION INTRAVENOUS at 12:09

## 2020-08-13 RX ADMIN — DEXAMETHASONE SODIUM PHOSPHATE 8 MG: 4 INJECTION, SOLUTION INTRAMUSCULAR; INTRAVENOUS at 12:09

## 2020-08-13 RX ADMIN — ONDANSETRON 4 MG: 2 INJECTION, SOLUTION INTRAMUSCULAR; INTRAVENOUS at 12:10

## 2020-08-13 RX ADMIN — LIDOCAINE HYDROCHLORIDE 80 MG: 20 INJECTION, SOLUTION INFILTRATION; PERINEURAL at 13:30

## 2020-08-13 RX ADMIN — PROPOFOL 150 MG: 10 INJECTION, EMULSION INTRAVENOUS at 13:31

## 2020-08-13 RX ADMIN — FENTANYL CITRATE 25 MCG: 50 INJECTION, SOLUTION INTRAMUSCULAR; INTRAVENOUS at 13:53

## 2020-08-13 RX ADMIN — DIPHENHYDRAMINE HYDROCHLORIDE 12.5 MG: 50 INJECTION, SOLUTION INTRAMUSCULAR; INTRAVENOUS at 12:09

## 2020-08-13 RX ADMIN — KETAMINE HYDROCHLORIDE 20 MG: 100 INJECTION INTRAMUSCULAR; INTRAVENOUS at 13:30

## 2020-08-13 RX ADMIN — CEFAZOLIN SODIUM 2 G: 2 SOLUTION INTRAVENOUS at 13:30

## 2020-08-13 RX ADMIN — SODIUM CHLORIDE, POTASSIUM CHLORIDE, SODIUM LACTATE AND CALCIUM CHLORIDE 9 ML/HR: 600; 310; 30; 20 INJECTION, SOLUTION INTRAVENOUS at 12:10

## 2020-08-13 RX ADMIN — KETAMINE HYDROCHLORIDE 10 MG: 100 INJECTION INTRAMUSCULAR; INTRAVENOUS at 14:03

## 2020-08-13 RX ADMIN — FENTANYL CITRATE 25 MCG: 50 INJECTION, SOLUTION INTRAMUSCULAR; INTRAVENOUS at 13:49

## 2020-08-13 NOTE — H&P
"Chief Complaint   Patient presents with   • Hernia       umbilical         Patient is a 44 y.o. male referred by Casa Owen MD for evaluation of a periumbilical hernia.  Patient first noticed it about 6 months ago and it has increased in size and is painful with activities.  Patient reports it is better when he rests.  Patient denies fever, chills, nausea or vomiting.  Patient has never had a hernia in the past.     Medical History        Past Medical History:   Diagnosis Date   • Anxiety     • Breast asymmetry 08/2019     Right lump   • Esophageal varix (CMS/HCC)       Multiple bandings   • GERD (gastroesophageal reflux disease)     • History of cirrhosis       Stage III/Dr. Giraldo [facility]   • Hypotestosteronism     • Infectious viral hepatitis       C   • Jaundice     • Substance abuse (CMS/HCC)       H/O drug use, sees substance abuse doctor         Surgical History         Past Surgical History:   Procedure Laterality Date   • APPENDECTOMY       • BRAIN SURGERY         20 years ago \"drained fluid pocket\"?   • ESOPHAGUS SURGERY         Banding more than 10 times   • SHOULDER ARTHROSCOPY Right 1995     Rotator cuff               Family History   Problem Relation Age of Onset   • Coronary artery disease Father        Social History            Tobacco Use   • Smoking status: Current Every Day Smoker       Packs/day: 1.00       Years: 25.00       Pack years: 25.00       Types: Cigarettes   • Smokeless tobacco: Never Used   Substance Use Topics   • Alcohol use: Yes       Comment: occasional   • Drug use: Yes      No Known Allergies     Current Outpatient Medications:   •  buprenorphine-naloxone (SUBOXONE) 8-2 MG per SL tablet, Dissolve 2 tablets under tongue daily, Disp: , Rfl:   •  furosemide (LASIX) 40 MG tablet, TK 1 T PO  QAM, Disp: , Rfl:   •  omeprazole (priLOSEC) 20 MG capsule, Take 20 mg by mouth Daily., Disp: , Rfl:   •  spironolactone (ALDACTONE) 100 MG tablet, TK 1 T PO  QAM, Disp: , Rfl:      Review " of Systems   Constitutional: Positive for activity change.        Weakness   Gastrointestinal: Positive for abdominal pain, diarrhea and nausea.   Musculoskeletal: Positive for joint swelling.   Skin:        Poor wound healing   All other systems reviewed and are negative.     /78 (BP Location: Right arm)   Pulse 70   Temp 98.1 °F (36.7 °C) (Oral)   Resp 19   Wt 71.7 kg (158 lb)   SpO2 99%   BMI 22.67 kg/m²         Physical Exam  General/physcological:   Alert and oriented x3, in no acute distress  HEENT: Normal cephalic, atraumatic, PERRLA, EOMI, sclera anicteric, moist mucous membranes, neck is supple, no JVD, no carotid bruits, no thyromegaly no adenopathy  Respiratory: CTA and percussion  CVA: RRR, normal S1-S2, no murmurs, no gallops or rubs  GI: Positive BS, soft, nondistended, nontender, no rebound, no guarding, reducible umbilical hernia, no organomegaly and no masses  Musculoskeletal: Moves all 4 ext, no clubbing, no cyanosis or edema  Neurovascular: Grossly intact  Debilities: none  Emotional behavior: appropriate      Patient does use tobacco products currently.  I have advised the patient to stop smoking.     Assessment:  Reducible umbilical hernia  Plan:  I have recommended that the patient undergo an open umbilical hernia repair with mesh.  I have discussed this in detail with the patient and discussed the risks, benefits and alternatives.  I have discussed the risk of anesthesia, bleeding, infection, bowel injuries and recurrence.  Patient understands risks, benefits and alternatives and wishes to proceed.  I have him scheduled at his earliest convenience.     Sylvia Guzman MD  General, Minimally Invasive and Endoscopic Surgery  [facility]        [facility]         [address] [place] [address]   [address]                                  [address]  [place], [place] [address] [place] [address]     P: [phone]  F: [phone]     Cc:  Casa Owen,  MD

## 2020-08-13 NOTE — ANESTHESIA PREPROCEDURE EVALUATION
Anesthesia Evaluation     Patient summary reviewed and Nursing notes reviewed   no history of anesthetic complications:  NPO Solid Status: > 8 hours  NPO Liquid Status: > 2 hours           Airway   Mallampati: II  TM distance: >3 FB  Neck ROM: full  No difficulty expected  Dental    (+) edentulous, upper dentures and lower dentures    Pulmonary - normal exam    breath sounds clear to auscultation  (+) a smoker (1 ppd) Current Smoked day of surgery,   Cardiovascular - negative cardio ROS and normal exam    Rhythm: regular  Rate: normal        Neuro/Psych  (+) psychiatric history Anxiety,     GI/Hepatic/Renal/Endo    (+)  GERD poorly controlled,  liver disease,     ROS Comment: Esoph varices. Banded about every 3 months      Musculoskeletal     Abdominal  - normal exam   Substance History   (+) drug use (MJ- last used 10 years ago)     OB/GYN          Other        Blood dyscrasia: chronic anemia, low platelets.                  Anesthesia Plan    ASA 3     general     intravenous induction     Anesthetic plan, all risks, benefits, and alternatives have been provided, discussed and informed consent has been obtained with: patient.  Use of blood products discussed with patient  Consented to blood products.

## 2020-08-13 NOTE — ANESTHESIA POSTPROCEDURE EVALUATION
Patient: Ra Estrada    Procedure Summary     Date:  08/13/20 Room / Location:   [facility] OR 3 /  [facility]    Anesthesia Start:  1327 Anesthesia Stop:  1415    Procedure:  UMBILICAL HERNIA REPAIR (N/A Abdomen) Diagnosis:       Umbilical hernia without obstruction and without gangrene      (Umbilical hernia without obstruction and without gangrene [K42.9])    Surgeon:  Sylvia Guzman MD Provider:  Blaise Marie CRNA    Anesthesia Type:  general ASA Status:  3          Anesthesia Type: general    Vitals  Vitals Value Taken Time   /74 8/13/2020  2:50 PM   Temp 97.8 °F (36.6 °C) 8/13/2020  2:12 PM   Pulse 90 8/13/2020  2:50 PM   Resp 16 8/13/2020  2:50 PM   SpO2 97 % 8/13/2020  2:50 PM           Post Anesthesia Care and Evaluation    Patient location during evaluation: bedside  Patient participation: complete - patient participated  Level of consciousness: awake and alert  Pain score: 0  Pain management: adequate  Airway patency: patent  Anesthetic complications: No anesthetic complications    Cardiovascular status: acceptable  Respiratory status: acceptable  Hydration status: acceptable

## 2020-08-13 NOTE — ANESTHESIA PROCEDURE NOTES
Airway  Urgency: elective    Date/Time: 8/13/2020 1:33 PM  Airway not difficult    General Information and Staff    Patient location during procedure: OR  CRNA: Blaise Marie CRNA    Indications and Patient Condition  Indications for airway management: airway protection    Preoxygenated: yes  MILS maintained throughout  Mask difficulty assessment: 1 - vent by mask    Final Airway Details  Final airway type: supraglottic airway      Successful airway: classic  Size 4    Number of attempts at approach: 1  Assessment: lips, teeth, and gum same as pre-op    Additional Comments  To OR, monitors and O2 on. Smooth IV ind. - LMA inserted  x 1 attempt + BBS. Tape OU. Pressure points checked and padded

## 2020-08-13 NOTE — OP NOTE
Operative Repair    Pre-op Dx:  Umbilical Hernia    Post-op Dx: Same    Procedure: Open Umbilical Hernia Repair     Surgeon:  Sylvia Guzman MD    Assistant: Cyndi Goncalves    Anesthesia: General    EBL: Minimum      Specimens:   Order Name Source Comment Collection Info Order Time   POTASSIUM   Collected By: Alena Brooke RN 8/13/2020 10:21 AM       Complications: None    Findings:  Umbilical Hernia    Indications: This is a pleasant 45 y.o. male patient that has a symptomatic umbilical hernia that has been increasing over time.    Procedure: After general endotracheal anesthesia, patient was prepped and draped in the usual sterile fashion.  Curvilinear infraumbilical incision was performed with an 11 blade scalpel.  The subcutaneous tissues dissected using cautery.  The hernia sac was identified and freed from the umbilicus using cautery and Metzenbaum's scissors.  The hernia sac was dissected in a circumferential manner at the base clearing 1 cm of fascia.  The sac was opened and digital examination was performed which revealed no anterior abdominal wall adhesions.  The sac was then excised using cautery.  The fascia was closed  using figure-of-eight 0 Nurolon pop off sutures.  The umbilicus then was inverted and tacked to the fascia with 3-0 Vicryl.  Subcutaneous tissue was infiltrated with quarter percent Marcaine and epinephrine.  Copious irrigations was applied and meticulous hemostasis was maintained throughout the procedure.  All needles and sponge counts were correct ×2.  The skin was closed using 4-0 Vicryl.  A sterile dressing was applied and the patient was transferred to recovery room in stable condition.        Sylvia Guzman MD  General, Minimally Invasive and Endoscopic Surgery  [facility]    [facility] [address]   [address]    [address]  [place] [address]               [place] [address]    P: [phone]  F: [phone]    Cc:  Casa Owen  MD MAC

## 2020-08-13 NOTE — DISCHARGE INSTRUCTIONS
ABDOMINAL HERNIA REPAIR  POST OP RECOMMENDATIONS  Dr. Guzman  [phone]    ACTIVITIES:  1. Expect to rest the day of surgery, but get up several times daily to reduce the risk of getting a clot in your legs.  2. No strenuous activity or lifting over 10 lbs. for until 6 weeks out from surgery.  Try to avoid squatting and deep bends for about a week.  3. Do not drive while on pain medicine.  You must be off pain meds 24 hours before driving.  4. You can climb stairs, but minimize this and initially do one step at a time (both feet on one step rather than going up with each step.)  5. If an abdominal binder is recommended, wear only when not recumbent.     SYMPTOMS:  1. Skin blistering is not unusual at the incision due to the thinness of the skin from the chronic herniation.  If this is detected at the post-operative appointment it may simply be treated with a topical antibiotic.  2. Constipation is common when taking pain medication for surgery.  Over the counter laxatives, such as Miralax and Milk of Magnesium, can be used temporarily.   3. Fatigue and decreased stamina is not unusual for about a week or so after surgery due to anesthesia.  Try to take walks and some mild activity between resting.  4. If your procedure was performed laparoscopically as opposed to open, shoulder pain is not unusual from the “gas” used in laparoscopy which will dissipate within 1-3 days.  If it does not, call your physician.    WOUND SITE:  1. Dressings can be removed 2 days after procedure.The “tega-derm” may be removed in 2 days if instructed to.  2. Dressings may occasionally have spots of blood on them.  As long as it is dry, these do not need to be changed.  If it is soaked, then the dressing should be removed and a new dressing placed.  3. Skin irritation, redness or itching can prompt removal of the bandage earlier if present.  4. Redness or warmth that extends outside of the bandage or is spreading should prompt a call to  physician.  5. Steri-strips are to be left in place until they fall off on their own in 1-2 weeks.  If they are irritating then they may be removed sooner.  6. Showering may occur while the “tega-derm” is in place if you do not have a drain.  If it is off, then you must wait 2 days after surgery before showering.  7. If you have a drain, no showering until removed, only sponge bathe.  Empty the drain daily and record output.  Keep drain entry site covered.    MEALS:  1. Eat and Drink very lightly when returning home following surgery.  Jell-O, ginger ale, chicken noodle soup and crackers are good examples.  The day after surgery you may broaden your diet.  2. Do NOT take pain pills on an empty stomach.    WORK:  1. In general if you have a sedentary job, you can return to work in 7-10 days at the earliest.  If heavy lifting is required it may be 6 weeks or more.   Any changes to these numbers will be discussed post-operatively.  2. Use discretion and remember that your stamina will be decreased post operatively.  3. Return to work notes can be provided at the time of your post-operative appointment.    FOLLOW UP:  Call and make a post-operative appointment for approximately 2 weeks after the procedure.      Sylvia Guzman MD  General, Minimally Invasive and Endoscopic Surgery  [facility]    [facility] [address] [place] [address]    [address]  [place] [address]               [place] [address]    P: [phone]  F: [phone]    Cc:  Casa Owen MD

## 2020-09-01 ENCOUNTER — OFFICE VISIT (OUTPATIENT)
Dept: SURGERY | Facility: CLINIC | Age: 45
End: 2020-09-01

## 2020-09-01 VITALS
WEIGHT: 164 LBS | OXYGEN SATURATION: 99 % | DIASTOLIC BLOOD PRESSURE: 72 MMHG | RESPIRATION RATE: 18 BRPM | HEART RATE: 94 BPM | BODY MASS INDEX: 23.48 KG/M2 | HEIGHT: 70 IN | SYSTOLIC BLOOD PRESSURE: 130 MMHG

## 2020-09-01 DIAGNOSIS — Z09 FOLLOW UP: Primary | ICD-10-CM

## 2020-09-01 PROCEDURE — 99024 POSTOP FOLLOW-UP VISIT: CPT | Performed by: SURGERY

## 2020-09-01 NOTE — PROGRESS NOTES
"    Chief complaint:    Chief Complaint   Patient presents with   • Post-op       History of Present Illness    This is Ra Estrada 45 y.o. status post open umbilical hernia repair with mesh and is doing very well.  Patient denies fever, chills, nausea or vomiting.  Patient's pain is well-controlled.      The following portions of the patient's history were reviewed and updated as appropriate: allergies, current medications, past family history, past medical history, past social history, past surgical history and problem list.    Vitals:    09/01/20 0920   BP: 130/72   Pulse: 94   Resp: 18   SpO2: 99%   Weight: 74.4 kg (164 lb)   Height: 177.8 cm (70\")       Physical Exam  Gen.: Patient is alert and oriented ×3, no acute distress  HEENT: Normal cephalic, atraumatic, moist mucous membranes, anicteric  Incision is well-healed without evidence of infection, herniation or seroma.    Assessment/plan:    S/P open umbilical hernia repair with mesh  I have instructed the patient not lift greater than 10 pounds for total of 6 weeks from the time of surgery.   I have instructed the patient follow-up as needed.    Sylvia Guzman MD  General, Minimally Invasive and Endoscopic Surgery  [facility]    [facility] [address] [place] [address]    [address]  [address] [address]    P: [phone]  F: [phone]    Cc:  Casa Owen MD  "

## 2020-09-08 ENCOUNTER — LAB (OUTPATIENT)
Dept: LAB | Facility: HOSPITAL | Age: 45
End: 2020-09-08

## 2020-09-08 ENCOUNTER — OFFICE VISIT (OUTPATIENT)
Dept: ONCOLOGY | Facility: CLINIC | Age: 45
End: 2020-09-08

## 2020-09-08 VITALS
OXYGEN SATURATION: 100 % | TEMPERATURE: 98.2 F | RESPIRATION RATE: 16 BRPM | SYSTOLIC BLOOD PRESSURE: 127 MMHG | HEART RATE: 96 BPM | WEIGHT: 157.7 LBS | BODY MASS INDEX: 22.58 KG/M2 | HEIGHT: 70 IN | DIASTOLIC BLOOD PRESSURE: 87 MMHG

## 2020-09-08 DIAGNOSIS — D69.6 THROMBOCYTOPENIA (HCC): ICD-10-CM

## 2020-09-08 DIAGNOSIS — D64.9 ANEMIA, UNSPECIFIED TYPE: Primary | ICD-10-CM

## 2020-09-08 DIAGNOSIS — D64.9 ANEMIA, UNSPECIFIED TYPE: ICD-10-CM

## 2020-09-08 LAB
BASOPHILS # BLD AUTO: 0.02 10*3/MM3 (ref 0–0.2)
BASOPHILS NFR BLD AUTO: 0.3 % (ref 0–1.5)
DEPRECATED RDW RBC AUTO: 56.1 FL (ref 37–54)
EOSINOPHIL # BLD AUTO: 0.08 10*3/MM3 (ref 0–0.4)
EOSINOPHIL NFR BLD AUTO: 1.3 % (ref 0.3–6.2)
ERYTHROCYTE [DISTWIDTH] IN BLOOD BY AUTOMATED COUNT: 14.6 % (ref 12.3–15.4)
FERRITIN SERPL-MCNC: 83.1 NG/ML (ref 30–400)
HCT VFR BLD AUTO: 37.8 % (ref 37.5–51)
HGB BLD-MCNC: 12.6 G/DL (ref 13–17.7)
IMM GRANULOCYTES # BLD AUTO: 0.03 10*3/MM3 (ref 0–0.05)
IMM GRANULOCYTES NFR BLD AUTO: 0.5 % (ref 0–0.5)
IRON 24H UR-MRATE: 65 MCG/DL (ref 59–158)
IRON SATN MFR SERPL: 18 % (ref 14–48)
LYMPHOCYTES # BLD AUTO: 0.85 10*3/MM3 (ref 0.7–3.1)
LYMPHOCYTES NFR BLD AUTO: 13.9 % (ref 19.6–45.3)
MCH RBC QN AUTO: 34.9 PG (ref 26.6–33)
MCHC RBC AUTO-ENTMCNC: 33.3 G/DL (ref 31.5–35.7)
MCV RBC AUTO: 104.7 FL (ref 79–97)
MONOCYTES # BLD AUTO: 0.83 10*3/MM3 (ref 0.1–0.9)
MONOCYTES NFR BLD AUTO: 13.5 % (ref 5–12)
NEUTROPHILS NFR BLD AUTO: 4.32 10*3/MM3 (ref 1.7–7)
NEUTROPHILS NFR BLD AUTO: 70.5 % (ref 42.7–76)
NRBC BLD AUTO-RTO: 0 /100 WBC (ref 0–0.2)
PLATELET # BLD AUTO: 85 10*3/MM3 (ref 140–450)
PLATELETS.RETICULATED NFR BLD AUTO: 2.7 % (ref 0.9–6.5)
PMV BLD AUTO: 8.8 FL (ref 6–12)
RBC # BLD AUTO: 3.61 10*6/MM3 (ref 4.14–5.8)
TIBC SERPL-MCNC: 361 MCG/DL (ref 249–505)
TRANSFERRIN SERPL-MCNC: 258 MG/DL (ref 200–360)
VIT B12 BLD-MCNC: 924 PG/ML (ref 211–946)
WBC # BLD AUTO: 6.13 10*3/MM3 (ref 3.4–10.8)

## 2020-09-08 PROCEDURE — 99213 OFFICE O/P EST LOW 20 MIN: CPT | Performed by: INTERNAL MEDICINE

## 2020-09-08 PROCEDURE — 84466 ASSAY OF TRANSFERRIN: CPT

## 2020-09-08 PROCEDURE — 82607 VITAMIN B-12: CPT | Performed by: INTERNAL MEDICINE

## 2020-09-08 PROCEDURE — 83540 ASSAY OF IRON: CPT

## 2020-09-08 PROCEDURE — 36415 COLL VENOUS BLD VENIPUNCTURE: CPT

## 2020-09-08 PROCEDURE — 82728 ASSAY OF FERRITIN: CPT

## 2020-09-08 PROCEDURE — 85025 COMPLETE CBC W/AUTO DIFF WBC: CPT

## 2020-09-08 PROCEDURE — 85055 RETICULATED PLATELET ASSAY: CPT

## 2020-09-13 NOTE — PROGRESS NOTES
.     REASON FOR CONSULTATION:     1. Thrombocytopenia secondary to liver cirrhosis and splenomegaly.   2. Mild anemia, with suboptimal iron and vitamin B12.  Patient was started on oral elementary iron 65 mg daily and vitamin B12 at 1000 mcg daily in March 2020.                                 HISTORY OF PRESENT ILLNESS:  The patient is a 45 y.o. male with above history, presented today for 6 months follow-up evaluation and review of liver studies.     Patient reports he is at baseline condition, denies easy bleeding or bruising.  He continues to have chronic fatigue and a poor appetite.  Denies abdominal distention or lower extremity edema.  Performance status is ECOG 1.      He has been taking oral iron once a day and oral vitamin B-12 once a day since March 2020.    Laboratory study today showed persistent mild macrocytic anemia with Hb 12.6, .7, and platelets 85,000, IPF 2.7%.  He has normal WBC 6130 including ANC 4320 lymphocytes 850.    Past Medical History:   Diagnosis Date   • Anxiety    • Breast asymmetry 08/2019    Right lump   • Esophageal varix (CMS/HCC)     Multiple bandings, Dr Giraldo [facility] follows, has bandings every 2 months   • GERD (gastroesophageal reflux disease)    • History of cirrhosis     Stage III/Dr. Giraldo [facility]   • History of GI bleed    • History of kidney stones    • History of transfusion    • Hx of ascites    • Hypotestosteronism    • Infectious viral hepatitis     C   • Jaundice    • Substance abuse (CMS/HCC)     H/O drug use, sees substance abuse doctor   • Thrombocytopenia (CMS/HCC)     sees Dr Jay   • Umbilical hernia     sched repair     Past Surgical History:   Procedure Laterality Date   • APPENDECTOMY     • ENDOSCOPY     • ENDOSCOPY W/ BANDING      multiple   • ESOPHAGUS SURGERY      Banding more than 10 times   • ORIF FEMUR FRACTURE Right 10/2019   • PITUITARY SURGERY      drained   • SHOULDER ARTHROSCOPY Right 1995    Rotator cuff   • UMBILICAL HERNIA REPAIR N/A  8/13/2020    Procedure: UMBILICAL HERNIA REPAIR;  Surgeon: Sylvia Guzman MD;  Location: [facility];  Service: General;  Laterality: N/A;     ONCOLOGIC/HEMATOLOGIC HISTORY: The patient is a 44 y.o. male with history as below who is here on 3/3/2020 for initial evaluation of thrombocytopenia.  The patient presents by himself today.      Patient reports that he is aware of having low platelet counts for many years.      He has history of GI bleeding due to esophageal varices.  The patient had an EGD done by Dr. Nikkie Giraldo on 4/15/2019, which revealed grade I esophageal varices, but no evidence of malignancy.  He reports he has had about 10 esophageal banding procedures done, and his most recent esophageal banding was performed about 3 weeks ago at [facility].  He notes he has the esophageal banding procedure repeated about every 3 months.  He denies any recent melena or hematochezia.  He reports occasional abdominal distention and abdominal discomfort, but he denies any significant abdominal pain.  Patient reports that he had a previous ascites, but no paracentesis.  He was given diuretics, he also become more physically active and exercise in gym which made his situation better.    He reports easy bruising with blood draws, but otherwise denies abnormal bleeding or bruising.  He denies any active bleeding at this time.    The patient was diagnosed with cirrhosis of the liver about 10 years ago due to Hepatitis C from illicit drug use.  I reviewed the CT scan of the abdomen and pelvis from 2/29/2016, which demonstrated cirrhosis and splenomegaly 15 cm.   He notes that his Hepatitis C is cured with the treatment, and he is no longer using illicit drugs.  He denies any history of heavy alcohol use and denies any alcohol use at this time.    He denies any family history of cancer or blood disorders.  The patient currently works as a .    Per our records, the patient has had a low platelet count since  1/16/2012.  He was previously followed by hematology service Dr. Dye at the [facility] from 3/19/2012 for thrombocytopenia and liver cirrhosis.  It seems patient also had portal vein thrombosis and was on Coumadin anticoagulation at that time.  According to the clinic note, he had platelets 33,000 in 2010.    Patient had a hospitalization at the [facility] in late August 2019 due to left femur fracture for which he had ORIF.  Laboratory study on 8/24/2019 reported a platelets 66,000, hemoglobin 13.0 MCV 98 and WBC 8600 including ANC 5970, lymphocytes 1310 and monocytes 1200.  His platelets dropped at 39,000 on 8/25/2019 with Hb 12.4, MCV 99, WBC 5100 and without differentiation.  Platelets improved at 47,000 on 8/26/2019 and hemoglobin 11.7 WBC 5100 without differentiation.    Outside laboratory study from 1/16/2013 reported hemoglobin 15.3, .1, platelets 96,000, ANC 4400, with WBC 7300.    Laboratory study on 8/9/2019 reported WBC 5900, ANC 4213, hemoglobin 13.6 hematocrit 40.5, .7 MCHC 33.6, and platelets 55,000.  Lymphocytes was 897, monocytes 696.  His vitamin B12 level was 601 pg/mL, negative PSA less than 0.1, TSH normal at 0.90, and vitamin D 25 ng/mL and normal lipid profile.  Chemistry lab reported normal creatinine 0.83 normal electrolytes, normal liver function panel but a slightly elevated alk phosphatase 142.    Laboratory study on 3/3/2020 reported platelets 69,000, IPF 5%, hemoglobin 13.7 .0 and WBC 5010 including ANC 3610 lymphocytes 590.  Other labs reported a normal , normal liver function panel, total protein 7.0 and albumin 4.2 globulin 2.8.  Normal renal function and glucose level.  Marginally low potassium 3.4 otherwise normal electrolytes.  Serum protein immunofixation was negative for monoclonal protein.  Vitamin B12 level 671 pg/mL, folate 19.2, ferritin 52.3 free iron 152 TIBC 407 and iron saturation 37%, haptoglobin  48.      MEDICATIONS    Current Outpatient Medications:   •  furosemide (LASIX) 40 MG tablet, Take 40 mg by mouth Daily., Disp: , Rfl:   •  omeprazole (priLOSEC) 20 MG capsule, Take 20 mg by mouth Daily., Disp: , Rfl:   •  POTASSIUM PO, Take  by mouth As Needed (cramps). OTC, Disp: , Rfl:   •  spironolactone (ALDACTONE) 100 MG tablet, Take 100 mg by mouth Daily., Disp: , Rfl:    · Oral vitamin B12 at 1000 mcg daily.  · Oral iron 65 mg daily.     ALLERGIES:   No Known Allergies    SOCIAL HISTORY:       Social History     Socioeconomic History   • Marital status:      Spouse name: Not on file   • Number of children: Not on file   • Years of education: College   • Highest education level: Not on file   Occupational History   • Occupation: [employer]     Employer: DISABLED     Comment: [id number]   Tobacco Use   • Smoking status: Current Every Day Smoker     Packs/day: 1.00     Years: 25.00     Pack years: 25.00     Types: Cigarettes   • Smokeless tobacco: Never Used   Substance and Sexual Activity   • Alcohol use: Yes     Comment: occasional   • Drug use: Yes     Frequency: 3.0 times per week     Types: Marijuana   • Sexual activity: Defer         FAMILY HISTORY:  Family History   Problem Relation Age of Onset   • Coronary artery disease Father    • Malig Hyperthermia Neg Hx        REVIEW OF SYSTEMS:  Review of Systems   Constitutional: Positive for appetite change (poor appetite) and fatigue. Negative for chills, diaphoresis, fever and unexpected weight change.   HENT: Negative for hearing loss, nosebleeds and sore throat.    Eyes: Negative for photophobia and visual disturbance.   Respiratory: Negative for cough and shortness of breath.    Cardiovascular: Negative for chest pain, palpitations and leg swelling.   Gastrointestinal: Positive for nausea (Occasional). Negative for abdominal distention, abdominal pain (discomfort), blood in stool and diarrhea.        Chronic indigestion   Endocrine: Positive for  "cold intolerance, polydipsia and polyuria. Negative for heat intolerance.   Genitourinary: Negative for difficulty urinating, dysuria and hematuria.   Musculoskeletal: Positive for arthralgias. Negative for joint swelling and myalgias.   Skin: Negative for color change and rash.   Allergic/Immunologic: Negative for environmental allergies.   Neurological: Positive for numbness and headaches. Negative for dizziness and syncope.   Hematological: Negative for adenopathy. Bruises/bleeds easily (easy bruising with blood draws).   Psychiatric/Behavioral: Negative for dysphoric mood and sleep disturbance. The patient is not nervous/anxious.               Vitals:    09/08/20 0944   BP: 127/87   Pulse: 96   Resp: 16   Temp: 98.2 °F (36.8 °C)   SpO2: 100%   Weight: 71.5 kg (157 lb 11.2 oz)   Height: 177.8 cm (70\")   PainSc:   2   PainLoc: Comment: abd     Current Status 9/8/2020   ECOG score 0        PHYSICAL EXAM:    CONSTITUTIONAL:  Well-developed, well-nourished male in no distress, looks comfortable.    EYES:  Conjunctiva and lids unremarkable.  Pupils equal and round.  EARS,NOSE,MOUTH,THROAT:  Patient wears mask due to the pandemic coronavirus infection.   RESPIRATORY:  Normal respiratory effort.  Lungs clear to auscultation bilaterally.  CARDIOVASCULAR: Regular rhythm and normal rate.  Normal S1, S2.  No murmurs.    GASTROINTESTINAL: Abdomen no tender no distention.  No hepatomegaly.  No splenomegaly.  Bowel sounds normal.  LYMPHATIC:  No cervical lymphadenopathy.  MUSCULOSKELETAL:  Unremarkable gait and station.  No cyanosis or clubbing.  No leg edema.  SKIN:  Skin appears gray.  Warm.  No rashes.  PSYCHIATRIC:  Normal judgment and insight.  Normal mood and affect.    RECENT LABS:  Lab Results   Component Value Date    WBC 6.13 09/08/2020    HGB 12.6 (L) 09/08/2020    HCT 37.8 09/08/2020    .7 (H) 09/08/2020    PLT 85 (L) 09/08/2020     Lab Results   Component Value Date    NEUTROABS 4.32 09/08/2020     Lab " Results   Component Value Date    FYLHRBXT54 924 09/08/2020     Lab Results   Component Value Date    FOLATE 19.20 03/03/2020     Lab Results   Component Value Date    IRON 65 09/08/2020    TIBC 361 09/08/2020    FERRITIN 83.10 09/08/2020   Iron saturation 18% on 9/8/2020.    Glucose   Date Value Ref Range Status   08/07/2020 70 65 - 99 mg/dL Final     BUN   Date Value Ref Range Status   08/07/2020 13 6 - 20 mg/dL Final     Creatinine   Date Value Ref Range Status   08/07/2020 0.78 0.76 - 1.27 mg/dL Final     Sodium   Date Value Ref Range Status   08/07/2020 139 136 - 145 mmol/L Final     Potassium   Date Value Ref Range Status   08/13/2020 3.7 3.5 - 5.2 mmol/L Final     Chloride   Date Value Ref Range Status   08/07/2020 101 98 - 107 mmol/L Final     CO2   Date Value Ref Range Status   08/07/2020 29.1 (H) 22.0 - 29.0 mmol/L Final     Calcium   Date Value Ref Range Status   08/07/2020 9.0 8.6 - 10.5 mg/dL Final     Total Protein   Date Value Ref Range Status   08/07/2020 6.7 6.0 - 8.5 g/dL Final     Albumin   Date Value Ref Range Status   08/07/2020 3.90 3.50 - 5.20 g/dL Final     ALT (SGPT)   Date Value Ref Range Status   08/07/2020 43 (H) 1 - 41 U/L Final     AST (SGOT)   Date Value Ref Range Status   08/07/2020 44 (H) 1 - 40 U/L Final     Alkaline Phosphatase   Date Value Ref Range Status   08/07/2020 149 (H) 39 - 117 U/L Final     Total Bilirubin   Date Value Ref Range Status   08/07/2020 0.9 0.0 - 1.2 mg/dL Final     eGFR Non  Amer   Date Value Ref Range Status   08/07/2020 107 >60 mL/min/1.73 Final     BUN/Creatinine Ratio   Date Value Ref Range Status   08/07/2020 16.7 7.0 - 25.0 Final     Anion Gap   Date Value Ref Range Status   08/07/2020 8.9 5.0 - 15.0 mmol/L Final       Assessment/Plan     ASSESSMENT:  1.  Thrombocytopenia secondary to liver cirrhosis.    · Patient had hepatitis C which was cured according to patient.    · Laboratory studies documented thrombocytopenia dating back at least to  2010.    · He reports easy bruising with blood draws, but otherwise denies abnormal bleeding or bruising.  He denies any active bleeding at this time.     · Laboratory studies 3/3/2020, show platelets 66,000.  Further laboratory study and review of peripheral blood smear showed no evidence of increased young platelets. Most likely his thrombocytopenia is caused by his liver cirrhosis.   · Patient had a mediocre vitamin B12 level.    · Patient was started on oral vitamin B12 in March 2020.   · Improved B12 level at 924 pg/mL on 9/8/2020.  His platelets is 85,000.    2.  Cirrhosis of the liver and the splenomegaly.   · The patient was diagnosed with cirrhosis of the liver about 10 years ago due to Hepatitis C from illicit drug use.  I reviewed the CT scan of the abdomen and pelvis from 2/29/2016, which demonstrated cirrhosis and splenomegaly.     · He notes that his Hepatitis C was cured, and he is no longer using illicit drugs.    3.  Mild macrocytic anemia.    · He has history of GI bleeding due to esophageal varices.  He reports he has had about 10 esophageal banding procedures done, repeated about every 3 months.  He denies any recent abnormal bleeding.  · Laboratory studies on 3/3/2020, show hemoglobin 13.7.  Laboratory study and peripheral blood smear showed no evidence of hemolysis.    · He had a mediocre vitamin B12 level, and also suboptimal ferritin level despite normal iron saturation.  His suboptimal ferritin level could be related to previous GI bleeding from esophageal varices.  · His mild macrocytosis is likely also related to liver cirrhosis.  · I recommended patient to start oral iron once a day in March 2020.    · His lab study on 9/8/2020 showed improve ferritin level 83 ng/mL however no improvement of hemoglobin 12.6.       PLAN:  1. Continue oral elementary iron 65 mg daily.    2. Continue oral vitamin B12 at 1000 mcg daily.    3. We will bring patient back in 12 months for reevaluation, we will  repeat labs CBC, ferritin and iron profile together with vitamin B12 level.        GANESH DAWN M.D., Ph.D.    9/8/2020      CC: Casa Owen MD, [place], [place].

## 2020-10-06 ENCOUNTER — OFFICE VISIT (OUTPATIENT)
Dept: SURGERY | Facility: CLINIC | Age: 45
End: 2020-10-06

## 2020-10-06 VITALS
HEART RATE: 112 BPM | DIASTOLIC BLOOD PRESSURE: 74 MMHG | RESPIRATION RATE: 18 BRPM | HEIGHT: 70 IN | OXYGEN SATURATION: 98 % | WEIGHT: 165 LBS | SYSTOLIC BLOOD PRESSURE: 130 MMHG | BODY MASS INDEX: 23.62 KG/M2

## 2020-10-06 DIAGNOSIS — K42.9 RECURRENT UMBILICAL HERNIA: Primary | ICD-10-CM

## 2020-10-06 PROCEDURE — 99024 POSTOP FOLLOW-UP VISIT: CPT | Performed by: SURGERY

## 2020-10-06 RX ORDER — SODIUM CHLORIDE 9 MG/ML
100 INJECTION, SOLUTION INTRAVENOUS CONTINUOUS
Status: CANCELLED | OUTPATIENT
Start: 2020-10-06

## 2020-10-06 NOTE — PROGRESS NOTES
Chief complaint: Periumbilical pain    HPI: This is a pleasant 45-year-old gentleman who underwent an open umbilical primary repair without mesh secondary to his ascites and need for paracentesis.  However, patient is now presenting with a recurrence at the upper left corner of the previous hernia site.  Patient reports it is painful especially when his ascites is increased before paracentesis.  Patient is desiring to have this repaired.  Patient denies fever, chills, nausea or vomiting.      Past Medical History:   Diagnosis Date   • Anxiety    • Breast asymmetry 08/2019    Right lump   • Esophageal varix (CMS/HCC)     Multiple bandings, Dr Giraldo [facility] follows, has bandings every 2 months   • GERD (gastroesophageal reflux disease)    • History of cirrhosis     Stage III/Dr. Giraldo [facility]   • History of GI bleed    • History of kidney stones    • History of transfusion    • Hx of ascites    • Hypotestosteronism    • Infectious viral hepatitis     C   • Jaundice    • Substance abuse (CMS/HCC)     H/O drug use, sees substance abuse doctor   • Thrombocytopenia (CMS/HCC)     sees Dr Jay   • Umbilical hernia     sched repair     Past Surgical History:   Procedure Laterality Date   • APPENDECTOMY     • ENDOSCOPY     • ENDOSCOPY W/ BANDING      multiple   • ESOPHAGUS SURGERY      Banding more than 10 times   • ORIF FEMUR FRACTURE Right 10/2019   • PITUITARY SURGERY      drained   • SHOULDER ARTHROSCOPY Right 1995    Rotator cuff   • UMBILICAL HERNIA REPAIR N/A 8/13/2020    Procedure: UMBILICAL HERNIA REPAIR;  Surgeon: Sylvia Guzman MD;  Location: [facility];  Service: General;  Laterality: N/A;     Family History   Problem Relation Age of Onset   • Coronary artery disease Father    • Malig Hyperthermia Neg Hx      Social History     Tobacco Use   • Smoking status: Current Every Day Smoker     Packs/day: 1.00     Years: 25.00     Pack years: 25.00     Types: Cigarettes   • Smokeless tobacco: Never Used   Substance Use  "Topics   • Alcohol use: Yes     Comment: occasional   • Drug use: Yes     Frequency: 3.0 times per week     Types: Marijuana     No Known Allergies    Current Outpatient Medications:   •  furosemide (LASIX) 40 MG tablet, Take 40 mg by mouth Daily., Disp: , Rfl:   •  omeprazole (priLOSEC) 20 MG capsule, Take 20 mg by mouth Daily., Disp: , Rfl:   •  POTASSIUM PO, Take  by mouth As Needed (cramps). OTC, Disp: , Rfl:   •  spironolactone (ALDACTONE) 100 MG tablet, Take 100 mg by mouth Daily., Disp: , Rfl:     Review of Systems    Constitutional: Positive for activity change.        Weakness   Gastrointestinal: Positive for abdominal pain, diarrhea and nausea.   Musculoskeletal: Positive for joint swelling.   Skin:        Poor wound healing   All other systems reviewed and are negative.      Vitals:    10/06/20 0910   BP: 130/74   Pulse: 112   Resp: 18   SpO2: 98%   Weight: 74.8 kg (165 lb)   Height: 177.8 cm (70\")       Physical Exam  General/physcological:   Alert and oriented x3, in no acute distress  HEENT: Normal cephalic, atraumatic, PERRLA, EOMI, sclera anicteric, moist mucous membranes, neck is supple, no JVD, no carotid bruits, no thyromegaly no adenopathy  Respiratory: CTA and percussion  CVA: RRR, normal S1-S2, no murmurs, no gallops or rubs  GI: Positive BS, soft, nondistended, nontender, no rebound, no guarding, recurrent reducible umbilical hernia  Musculoskeletal: Moves all 4 ext, no clubbing, no cyanosis or edema  Neurovascular: Grossly intact  Debilities: none  Emotional behavior: appropriate     Patient does use tobacco products currently.  I have advised patient to stop smoking    Assessment:  Recurrent reducible umbilical hernia  Plan:  I have advised the patient of the risk of using mesh with ascites.  However I have also advised the patient of high recurrence rate without the mesh.  I will proceed with an open umbilical recurrent hernia repair using mesh.    Sylvia Guzman MD  General, Minimally " Invasive and Endoscopic Surgery  [facility], [facility]      [address]   [address]    [address]  [address] [address]    P: [phone]  F: [phone]    Cc:  Casa Owen MD

## 2020-10-06 NOTE — H&P (VIEW-ONLY)
Chief complaint: Periumbilical pain    HPI: This is a pleasant 45-year-old gentleman who underwent an open umbilical primary repair without mesh secondary to his ascites and need for paracentesis.  However, patient is now presenting with a recurrence at the upper left corner of the previous hernia site.  Patient reports it is painful especially when his ascites is increased before paracentesis.  Patient is desiring to have this repaired.  Patient denies fever, chills, nausea or vomiting.      Past Medical History:   Diagnosis Date   • Anxiety    • Breast asymmetry 08/2019    Right lump   • Esophageal varix (CMS/HCC)     Multiple bandings, Dr Giraldo [facility] follows, has bandings every 2 months   • GERD (gastroesophageal reflux disease)    • History of cirrhosis     Stage III/Dr. Giraldo [facility]   • History of GI bleed    • History of kidney stones    • History of transfusion    • Hx of ascites    • Hypotestosteronism    • Infectious viral hepatitis     C   • Jaundice    • Substance abuse (CMS/HCC)     H/O drug use, sees substance abuse doctor   • Thrombocytopenia (CMS/HCC)     sees Dr aJy   • Umbilical hernia     sched repair     Past Surgical History:   Procedure Laterality Date   • APPENDECTOMY     • ENDOSCOPY     • ENDOSCOPY W/ BANDING      multiple   • ESOPHAGUS SURGERY      Banding more than 10 times   • ORIF FEMUR FRACTURE Right 10/2019   • PITUITARY SURGERY      drained   • SHOULDER ARTHROSCOPY Right 1995    Rotator cuff   • UMBILICAL HERNIA REPAIR N/A 8/13/2020    Procedure: UMBILICAL HERNIA REPAIR;  Surgeon: Sylvia Guzman MD;  Location: [facility];  Service: General;  Laterality: N/A;     Family History   Problem Relation Age of Onset   • Coronary artery disease Father    • Malig Hyperthermia Neg Hx      Social History     Tobacco Use   • Smoking status: Current Every Day Smoker     Packs/day: 1.00     Years: 25.00     Pack years: 25.00     Types: Cigarettes   • Smokeless tobacco: Never Used   Substance Use  "Topics   • Alcohol use: Yes     Comment: occasional   • Drug use: Yes     Frequency: 3.0 times per week     Types: Marijuana     No Known Allergies    Current Outpatient Medications:   •  furosemide (LASIX) 40 MG tablet, Take 40 mg by mouth Daily., Disp: , Rfl:   •  omeprazole (priLOSEC) 20 MG capsule, Take 20 mg by mouth Daily., Disp: , Rfl:   •  POTASSIUM PO, Take  by mouth As Needed (cramps). OTC, Disp: , Rfl:   •  spironolactone (ALDACTONE) 100 MG tablet, Take 100 mg by mouth Daily., Disp: , Rfl:     Review of Systems    Constitutional: Positive for activity change.        Weakness   Gastrointestinal: Positive for abdominal pain, diarrhea and nausea.   Musculoskeletal: Positive for joint swelling.   Skin:        Poor wound healing   All other systems reviewed and are negative.      Vitals:    10/06/20 0910   BP: 130/74   Pulse: 112   Resp: 18   SpO2: 98%   Weight: 74.8 kg (165 lb)   Height: 177.8 cm (70\")       Physical Exam  General/physcological:   Alert and oriented x3, in no acute distress  HEENT: Normal cephalic, atraumatic, PERRLA, EOMI, sclera anicteric, moist mucous membranes, neck is supple, no JVD, no carotid bruits, no thyromegaly no adenopathy  Respiratory: CTA and percussion  CVA: RRR, normal S1-S2, no murmurs, no gallops or rubs  GI: Positive BS, soft, nondistended, nontender, no rebound, no guarding, recurrent reducible umbilical hernia  Musculoskeletal: Moves all 4 ext, no clubbing, no cyanosis or edema  Neurovascular: Grossly intact  Debilities: none  Emotional behavior: appropriate     Patient does use tobacco products currently.  I have advised patient to stop smoking    Assessment:  Recurrent reducible umbilical hernia  Plan:  I have advised the patient of the risk of using mesh with ascites.  However I have also advised the patient of high recurrence rate without the mesh.  I will proceed with an open umbilical recurrent hernia repair using mesh.    Slyvia Guzman MD  General, Minimally " Invasive and Endoscopic Surgery  [facility]      [address]   [address]    [address]  [address] [address]    P: [phone]  F: [phone]    Cc:  Casa Owen MD

## 2020-10-13 ENCOUNTER — APPOINTMENT (OUTPATIENT)
Dept: PREADMISSION TESTING | Facility: HOSPITAL | Age: 45
End: 2020-10-13

## 2020-10-13 VITALS
HEART RATE: 85 BPM | BODY MASS INDEX: 23.84 KG/M2 | DIASTOLIC BLOOD PRESSURE: 82 MMHG | RESPIRATION RATE: 16 BRPM | SYSTOLIC BLOOD PRESSURE: 125 MMHG | WEIGHT: 166.5 LBS | HEIGHT: 70 IN | OXYGEN SATURATION: 99 %

## 2020-10-13 LAB
ALBUMIN SERPL-MCNC: 3.9 G/DL (ref 3.5–5.2)
ALBUMIN/GLOB SERPL: 1.2 G/DL
ALP SERPL-CCNC: 195 U/L (ref 39–117)
ALT SERPL W P-5'-P-CCNC: 86 U/L (ref 1–41)
ANION GAP SERPL CALCULATED.3IONS-SCNC: 10.5 MMOL/L (ref 5–15)
AST SERPL-CCNC: 61 U/L (ref 1–40)
BILIRUB SERPL-MCNC: 0.9 MG/DL (ref 0–1.2)
BUN SERPL-MCNC: 12 MG/DL (ref 6–20)
BUN/CREAT SERPL: 16.4 (ref 7–25)
CALCIUM SPEC-SCNC: 9.4 MG/DL (ref 8.6–10.5)
CHLORIDE SERPL-SCNC: 99 MMOL/L (ref 98–107)
CO2 SERPL-SCNC: 24.5 MMOL/L (ref 22–29)
CREAT SERPL-MCNC: 0.73 MG/DL (ref 0.76–1.27)
DEPRECATED RDW RBC AUTO: 57.6 FL (ref 37–54)
ERYTHROCYTE [DISTWIDTH] IN BLOOD BY AUTOMATED COUNT: 14.5 % (ref 12.3–15.4)
GFR SERPL CREATININE-BSD FRML MDRD: 116 ML/MIN/1.73
GLOBULIN UR ELPH-MCNC: 3.3 GM/DL
GLUCOSE SERPL-MCNC: 116 MG/DL (ref 65–99)
HCT VFR BLD AUTO: 38.6 % (ref 37.5–51)
HGB BLD-MCNC: 12.7 G/DL (ref 13–17.7)
MCH RBC QN AUTO: 35 PG (ref 26.6–33)
MCHC RBC AUTO-ENTMCNC: 32.9 G/DL (ref 31.5–35.7)
MCV RBC AUTO: 106.3 FL (ref 79–97)
PLATELET # BLD AUTO: 94 10*3/MM3 (ref 140–450)
PMV BLD AUTO: 9 FL (ref 6–12)
POTASSIUM SERPL-SCNC: 3.9 MMOL/L (ref 3.5–5.2)
PROT SERPL-MCNC: 7.2 G/DL (ref 6–8.5)
RBC # BLD AUTO: 3.63 10*6/MM3 (ref 4.14–5.8)
SODIUM SERPL-SCNC: 134 MMOL/L (ref 136–145)
WBC # BLD AUTO: 7.68 10*3/MM3 (ref 3.4–10.8)

## 2020-10-13 PROCEDURE — C9803 HOPD COVID-19 SPEC COLLECT: HCPCS

## 2020-10-13 PROCEDURE — 85027 COMPLETE CBC AUTOMATED: CPT | Performed by: SURGERY

## 2020-10-13 PROCEDURE — 80053 COMPREHEN METABOLIC PANEL: CPT | Performed by: SURGERY

## 2020-10-13 PROCEDURE — 36415 COLL VENOUS BLD VENIPUNCTURE: CPT

## 2020-10-13 PROCEDURE — U0004 COV-19 TEST NON-CDC HGH THRU: HCPCS | Performed by: SURGERY

## 2020-10-13 RX ORDER — LANOLIN ALCOHOL/MO/W.PET/CERES
CREAM (GRAM) TOPICAL DAILY
COMMUNITY

## 2020-10-13 RX ORDER — TRAZODONE HYDROCHLORIDE 50 MG/1
50 TABLET ORAL NIGHTLY
COMMUNITY
End: 2022-03-08 | Stop reason: DRUGHIGH

## 2020-10-13 NOTE — DISCHARGE INSTRUCTIONS
PRE-ADMISSION TESTING INSTRUCTIONS FOR ADULTS    Take these medications the morning of surgery with a small sip of water: omeprazole      No aspirin, advil, aleve, ibuprofen, naproxen, diet pills, decongestants, or herbal/vitamins for a week prior to surgery.  Stop vitamins today    General Instructions:    • Do not eat solid food after midnight the night before surgery.  No gum, mints, or hard candy after midnight the night before surgery.  • You may drink clear liquids the day of surgery up until 2 hours before your arrival time.  (8:00 am)  • Clear liquids are liquids you can see through. Nothing RED in color.    Plain water    Sports drinks  Sodas     Gelatin (Jell-O)  Fruit juices without pulp such as white grape juice and apple juice  Popsicles that contain no fruit or yogurt  Tea or coffee (no cream or milk added)    • It is beneficial for you to have a clear drink that contains carbohydrates just before you leave your house and before your fasting time begins.  We suggest a 20 ounce bottle of Gatorade or Powerade for non-diabetic patients or a 20 ounce bottle of G2 or Powerade Zero for diabetic patients.  (8:00 am)    • Patients who avoid smoking, chewing tobacco and alcohol for 4 weeks prior to surgery have a reduced risk of post-operative complications.  If at all possible, quit smoking as many days before surgery as you can.    • Do not smoke, use chewing tobacco or drink alcohol the day of surgery    • Bring your C-PAP/ BI-PAP machine if you use one.  • Wear clean comfortable clothes and socks.  • Do not wear contact lenses, lotion, deodorant, or make-up.  Bring a case for your glasses if applicable. You may brush your teeth the morning of surgery.  • You may wear dentures/partials, do not put adhesive/glue on them.  • Bring crutches or walker if applicable.  • Leave all other jewelry and valuables at home.      Preventing a Surgical Site Infection:    • Shower the night before and on the morning of  surgery using the chlorhexidine soap you were given.  Use a clean washcloth with the soap.  Place clean sheets on your bed after showering the night before surgery. Do not use the CHG soap on your hair, face, or private areas. Wash your body gently for five (5) minutes. Do not scrub your skin.  Dry with a clean towel and dress in clean clothing.    • Do not shave the surgical area for 10 days-2 weeks prior to surgery  because the razor can irritate skin and make it easier to develop an infection.  • Make sure you, your family, and all healthcare providers clean their hands with soap and water or an alcohol based hand  before caring for you or your wound.      Day of surgery:    Your surgeon’s office will advise you of your arrival time for the day of surgery.    Upon arrival, a Pre-op nurse and Anesthesia provider will review your health history, obtain vital signs, and answer questions you may have.  The only belongings needed at this time will be your home medications and if applicable your C-PAP/BI-PAP machine.  If you are staying overnight your family can leave the rest of your belongings in the car and bring them to your room later.  A Pre-op nurse will start an IV and you may receive medication in preparation for surgery, including something to help you relax.  Your family will be able to see you in the Pre-op area.  While you are in surgery your family should notify the waiting room  if they leave the waiting room area and provide a contact phone number.    IF you have any questions, you can call the Pre-Admission Department at [phone] or your surgeon's office.  Notify your surgeon if  you become sick, have a fever, productive cough, or cannot be here the day of surgery    Please be aware that surgery does come with discomfort.  We want to make every effort to control your discomfort so please discuss any uncontrolled symptoms with your nurse.   Your doctor will most likely have  prescribed pain medications.      If you are going home after surgery, you will receive individualized written care instructions before being discharged.  A responsible adult (over the age of 18) must drive you to and from the hospital on the day of your surgery and stay with you for 24 hours after anesthesia.    If you are staying overnight following surgery, you will be transported to your hospital room following the recovery period.  [facility] has all private rooms.    You may receive a survey regarding the care you received. Your feedback is very important and will be used to collect the necessary data to help us to continue to provide excellent care.     Deductibles and co-payments are collected on the day of service. Please be prepared to pay the required co-pay, deductible or deposit on the day of service as defined by your plan.    Umbilical Herniorrhaphy  Herniorrhaphy is surgery to repair a hernia. A hernia is the protrusion of a part of an organ through an abdominal opening. An umbilical hernia means that your hernia is in the area around your navel. If the hernia is not repaired, the gap could get bigger. Your intestines or other tissues, such as fat, could get trapped in the gap. This can lead to other health problems, such as blocked intestines. If the hernia is fixed before problems set in, you may be allowed to go home the same day as the surgery (outpatient).  LET YOUR HEALTH CARE PROVIDER KNOW ABOUT:  · Allergies to food or medicine.  · Medicines taken, including vitamins, herbs, eye drops, over-the-counter medicines, and creams.  · Use of steroids (by mouth or creams).  · Previous problems with anesthetics or numbing medicines.  · History of bleeding problems or blood clots.  · Previous surgery.  · Other health problems, including diabetes and kidney problems.  · Possibility of pregnancy, if this applies.  RISKS AND COMPLICATIONS  · Pain.  · Excessive bleeding.  · Hematoma. This is a  pocket of blood that collects under the surgery site.  · Infection at the surgery site.  · Numbness at the surgery site.  · Swelling and bruising.  · Blood clots.  · Intestinal damage (rare).  · Scarring.  · Skin damage.  · Development of another hernia. This may require another surgery.  BEFORE THE PROCEDURE  · Ask your health care provider about changing or stopping your regular medicines. You may need to stop taking aspirin, nonsteroidal anti-inflammatory drugs (NSAIDs), vitamin E, and blood thinners as early as 2 weeks before the procedure.  · Do not eat or drink for 8 hours before the procedure, or as directed by your health care provider.  · You might be asked to shower or wash with an antibacterial soap before the procedure.  · Wear comfortable clothes that will be easy to put on after the procedure.  PROCEDURE  You will be given an intravenous (IV) tube. A needle will be inserted in your arm. Medicine will flow directly into your body through this needle. You might be given medicine to help you relax (sedative). You will be given medicine that numbs the area (local anesthetic) or medicine that makes you sleep (general anesthetic).  If you have open surgery:  · The surgeon will make a cut (incision) in your abdomen.  · The gap in the muscle wall will be repaired. The surgeon may sew the edges together over the gap or use a mesh material to strengthen the area. When mesh is used, the body grows new, strong tissue into and around it. This new tissue closes the gap.  · The surgeon will close the incision.  If you have laparoscopic surgery:  · The surgeon will make several small incisions in your abdomen.  · A thin, lighted tube (laparoscope) will be inserted into the abdomen through an incision. A camera is attached to the laparoscope that allows the surgeon to see inside the abdomen.  · Tools will be inserted through the other incisions to repair the hernia. Usually, mesh is used to cover the gap.  · The  surgeon will close the incisions with stitches.  AFTER THE PROCEDURE  · You will be taken to a recovery area. A nurse will watch and check your progress.  · When you are awake, feeling well, and taking fluids well, you may be allowed to go home. In some cases, you may need to stay overnight in the hospital.  · Arrange for someone to drive you home.     This information is not intended to replace advice given to you by your health care provider. Make sure you discuss any questions you have with your health care provider.     Document Released: 03/16/2010 Document Revised: 01/08/2016 Document Reviewed: 03/20/2013  [employer] Interactive Patient Education ©2016 [employer]

## 2020-10-13 NOTE — PAT
Pt here for PAT visit.  Pre-op tests completed, chg soap given, and instructions reviewed.  Instructed clears until 8:00 am dos and no smoking after midnight night prior, voiced understanding.  COVID pending.

## 2020-10-14 ENCOUNTER — ANESTHESIA EVENT (OUTPATIENT)
Dept: PERIOP | Facility: HOSPITAL | Age: 45
End: 2020-10-14

## 2020-10-14 LAB — SARS-COV-2 RNA RESP QL NAA+PROBE: NOT DETECTED

## 2020-10-15 ENCOUNTER — ANESTHESIA (OUTPATIENT)
Dept: PERIOP | Facility: HOSPITAL | Age: 45
End: 2020-10-15

## 2020-10-15 ENCOUNTER — HOSPITAL ENCOUNTER (OUTPATIENT)
Facility: HOSPITAL | Age: 45
Setting detail: HOSPITAL OUTPATIENT SURGERY
Discharge: HOME | End: 2020-10-15
Attending: SURGERY | Admitting: SURGERY

## 2020-10-15 VITALS
DIASTOLIC BLOOD PRESSURE: 88 MMHG | HEART RATE: 108 BPM | TEMPERATURE: 97.7 F | BODY MASS INDEX: 23.88 KG/M2 | SYSTOLIC BLOOD PRESSURE: 127 MMHG | OXYGEN SATURATION: 94 % | RESPIRATION RATE: 15 BRPM | WEIGHT: 166.4 LBS

## 2020-10-15 DIAGNOSIS — K42.9 RECURRENT UMBILICAL HERNIA: ICD-10-CM

## 2020-10-15 LAB
APTT PPP: 30.4 SECONDS (ref 24.3–38.1)
INR PPP: 1.25 (ref 0.9–1.1)
PROTHROMBIN TIME: 15.3 SECONDS (ref 12.1–15)

## 2020-10-15 PROCEDURE — 25010000002 ONDANSETRON PER 1 MG: Performed by: NURSE ANESTHETIST, CERTIFIED REGISTERED

## 2020-10-15 PROCEDURE — 25010000002 FENTANYL CITRATE (PF) 100 MCG/2ML SOLUTION: Performed by: NURSE ANESTHETIST, CERTIFIED REGISTERED

## 2020-10-15 PROCEDURE — 25010000002 PROPOFOL 10 MG/ML EMULSION: Performed by: NURSE ANESTHETIST, CERTIFIED REGISTERED

## 2020-10-15 PROCEDURE — 85730 THROMBOPLASTIN TIME PARTIAL: CPT | Performed by: NURSE ANESTHETIST, CERTIFIED REGISTERED

## 2020-10-15 PROCEDURE — 85610 PROTHROMBIN TIME: CPT | Performed by: NURSE ANESTHETIST, CERTIFIED REGISTERED

## 2020-10-15 PROCEDURE — 25010000002 DEXAMETHASONE PER 1 MG: Performed by: NURSE ANESTHETIST, CERTIFIED REGISTERED

## 2020-10-15 PROCEDURE — 25010000003 CEFAZOLIN SODIUM-DEXTROSE 2-3 GM-%(50ML) RECONSTITUTED SOLUTION: Performed by: SURGERY

## 2020-10-15 PROCEDURE — C1781 MESH (IMPLANTABLE): HCPCS | Performed by: SURGERY

## 2020-10-15 PROCEDURE — 25010000002 DIPHENHYDRAMINE PER 50 MG: Performed by: NURSE ANESTHETIST, CERTIFIED REGISTERED

## 2020-10-15 PROCEDURE — 49585 PR REPAIR UMBILICAL HERN,5+Y/O,REDUC: CPT | Performed by: SURGERY

## 2020-10-15 PROCEDURE — 25010000002 SUCCINYLCHOLINE PER 20 MG: Performed by: NURSE ANESTHETIST, CERTIFIED REGISTERED

## 2020-10-15 DEVICE — VENTRALEX ST HERNIA PATCH
Type: IMPLANTABLE DEVICE | Site: UMBILICAL | Status: FUNCTIONAL
Brand: VENTRALEX ST HERNIA PATCH

## 2020-10-15 RX ORDER — SODIUM CHLORIDE 9 MG/ML
40 INJECTION, SOLUTION INTRAVENOUS AS NEEDED
Status: DISCONTINUED | OUTPATIENT
Start: 2020-10-15 | End: 2020-10-15 | Stop reason: HOSPADM

## 2020-10-15 RX ORDER — ROCURONIUM BROMIDE 10 MG/ML
INJECTION, SOLUTION INTRAVENOUS AS NEEDED
Status: DISCONTINUED | OUTPATIENT
Start: 2020-10-15 | End: 2020-10-15 | Stop reason: SURG

## 2020-10-15 RX ORDER — OXYCODONE HYDROCHLORIDE AND ACETAMINOPHEN 5; 325 MG/1; MG/1
1 TABLET ORAL EVERY 4 HOURS PRN
Qty: 30 TABLET | Refills: 0 | Status: SHIPPED | OUTPATIENT
Start: 2020-10-15 | End: 2020-10-20

## 2020-10-15 RX ORDER — SUCCINYLCHOLINE CHLORIDE 20 MG/ML
INJECTION INTRAMUSCULAR; INTRAVENOUS AS NEEDED
Status: DISCONTINUED | OUTPATIENT
Start: 2020-10-15 | End: 2020-10-15 | Stop reason: SURG

## 2020-10-15 RX ORDER — ONDANSETRON 2 MG/ML
4 INJECTION INTRAMUSCULAR; INTRAVENOUS ONCE AS NEEDED
Status: DISCONTINUED | OUTPATIENT
Start: 2020-10-15 | End: 2020-10-15 | Stop reason: HOSPADM

## 2020-10-15 RX ORDER — MAGNESIUM HYDROXIDE 1200 MG/15ML
LIQUID ORAL AS NEEDED
Status: DISCONTINUED | OUTPATIENT
Start: 2020-10-15 | End: 2020-10-15 | Stop reason: HOSPADM

## 2020-10-15 RX ORDER — DIPHENHYDRAMINE HYDROCHLORIDE 50 MG/ML
12.5 INJECTION INTRAMUSCULAR; INTRAVENOUS
Status: DISCONTINUED | OUTPATIENT
Start: 2020-10-15 | End: 2020-10-15 | Stop reason: HOSPADM

## 2020-10-15 RX ORDER — MEPERIDINE HYDROCHLORIDE 25 MG/ML
12.5 INJECTION INTRAMUSCULAR; INTRAVENOUS; SUBCUTANEOUS
Status: DISCONTINUED | OUTPATIENT
Start: 2020-10-15 | End: 2020-10-15 | Stop reason: HOSPADM

## 2020-10-15 RX ORDER — SODIUM CHLORIDE 0.9 % (FLUSH) 0.9 %
10 SYRINGE (ML) INJECTION AS NEEDED
Status: DISCONTINUED | OUTPATIENT
Start: 2020-10-15 | End: 2020-10-15 | Stop reason: HOSPADM

## 2020-10-15 RX ORDER — HYDROMORPHONE HYDROCHLORIDE 1 MG/ML
0.5 INJECTION, SOLUTION INTRAMUSCULAR; INTRAVENOUS; SUBCUTANEOUS
Status: DISCONTINUED | OUTPATIENT
Start: 2020-10-15 | End: 2020-10-15 | Stop reason: HOSPADM

## 2020-10-15 RX ORDER — ONDANSETRON 4 MG/1
4 TABLET, FILM COATED ORAL EVERY 4 HOURS PRN
Qty: 30 TABLET | Refills: 1 | Status: SHIPPED | OUTPATIENT
Start: 2020-10-15 | End: 2020-11-14

## 2020-10-15 RX ORDER — FAMOTIDINE 10 MG/ML
20 INJECTION, SOLUTION INTRAVENOUS
Status: COMPLETED | OUTPATIENT
Start: 2020-10-15 | End: 2020-10-15

## 2020-10-15 RX ORDER — MIDAZOLAM HYDROCHLORIDE 2 MG/2ML
1 INJECTION, SOLUTION INTRAMUSCULAR; INTRAVENOUS
Status: DISCONTINUED | OUTPATIENT
Start: 2020-10-15 | End: 2020-10-15 | Stop reason: HOSPADM

## 2020-10-15 RX ORDER — SODIUM CHLORIDE 0.9 % (FLUSH) 0.9 %
10 SYRINGE (ML) INJECTION EVERY 12 HOURS SCHEDULED
Status: DISCONTINUED | OUTPATIENT
Start: 2020-10-15 | End: 2020-10-15 | Stop reason: HOSPADM

## 2020-10-15 RX ORDER — ONDANSETRON 2 MG/ML
4 INJECTION INTRAMUSCULAR; INTRAVENOUS ONCE AS NEEDED
Status: COMPLETED | OUTPATIENT
Start: 2020-10-15 | End: 2020-10-15

## 2020-10-15 RX ORDER — IBUPROFEN 600 MG/1
600 TABLET ORAL ONCE AS NEEDED
Status: COMPLETED | OUTPATIENT
Start: 2020-10-15 | End: 2020-10-15

## 2020-10-15 RX ORDER — DEXAMETHASONE SODIUM PHOSPHATE 4 MG/ML
8 INJECTION, SOLUTION INTRA-ARTICULAR; INTRALESIONAL; INTRAMUSCULAR; INTRAVENOUS; SOFT TISSUE ONCE AS NEEDED
Status: COMPLETED | OUTPATIENT
Start: 2020-10-15 | End: 2020-10-15

## 2020-10-15 RX ORDER — MIDAZOLAM HYDROCHLORIDE 2 MG/2ML
0.5 INJECTION, SOLUTION INTRAMUSCULAR; INTRAVENOUS
Status: DISCONTINUED | OUTPATIENT
Start: 2020-10-15 | End: 2020-10-15 | Stop reason: HOSPADM

## 2020-10-15 RX ORDER — DIPHENHYDRAMINE HYDROCHLORIDE 50 MG/ML
12.5 INJECTION INTRAMUSCULAR; INTRAVENOUS ONCE
Status: COMPLETED | OUTPATIENT
Start: 2020-10-15 | End: 2020-10-15

## 2020-10-15 RX ORDER — CEFAZOLIN SODIUM 2 G/50ML
2 SOLUTION INTRAVENOUS ONCE
Status: COMPLETED | OUTPATIENT
Start: 2020-10-15 | End: 2020-10-15

## 2020-10-15 RX ORDER — BUPIVACAINE HYDROCHLORIDE 2.5 MG/ML
INJECTION, SOLUTION EPIDURAL; INFILTRATION; INTRACAUDAL AS NEEDED
Status: DISCONTINUED | OUTPATIENT
Start: 2020-10-15 | End: 2020-10-15 | Stop reason: HOSPADM

## 2020-10-15 RX ORDER — LIDOCAINE HYDROCHLORIDE 10 MG/ML
0.5 INJECTION, SOLUTION EPIDURAL; INFILTRATION; INTRACAUDAL; PERINEURAL ONCE AS NEEDED
Status: DISCONTINUED | OUTPATIENT
Start: 2020-10-15 | End: 2020-10-15 | Stop reason: HOSPADM

## 2020-10-15 RX ORDER — FENTANYL CITRATE 50 UG/ML
INJECTION, SOLUTION INTRAMUSCULAR; INTRAVENOUS AS NEEDED
Status: DISCONTINUED | OUTPATIENT
Start: 2020-10-15 | End: 2020-10-15 | Stop reason: SURG

## 2020-10-15 RX ORDER — PROPOFOL 10 MG/ML
VIAL (ML) INTRAVENOUS AS NEEDED
Status: DISCONTINUED | OUTPATIENT
Start: 2020-10-15 | End: 2020-10-15 | Stop reason: SURG

## 2020-10-15 RX ORDER — KETAMINE HYDROCHLORIDE 100 MG/ML
INJECTION INTRAMUSCULAR; INTRAVENOUS AS NEEDED
Status: DISCONTINUED | OUTPATIENT
Start: 2020-10-15 | End: 2020-10-15 | Stop reason: SURG

## 2020-10-15 RX ORDER — SODIUM CHLORIDE 9 MG/ML
100 INJECTION, SOLUTION INTRAVENOUS CONTINUOUS
Status: DISCONTINUED | OUTPATIENT
Start: 2020-10-15 | End: 2020-10-15 | Stop reason: HOSPADM

## 2020-10-15 RX ORDER — SODIUM CHLORIDE, SODIUM LACTATE, POTASSIUM CHLORIDE, CALCIUM CHLORIDE 600; 310; 30; 20 MG/100ML; MG/100ML; MG/100ML; MG/100ML
9 INJECTION, SOLUTION INTRAVENOUS CONTINUOUS PRN
Status: DISCONTINUED | OUTPATIENT
Start: 2020-10-15 | End: 2020-10-15 | Stop reason: HOSPADM

## 2020-10-15 RX ADMIN — KETAMINE HYDROCHLORIDE 10 MG: 100 INJECTION INTRAMUSCULAR; INTRAVENOUS at 14:47

## 2020-10-15 RX ADMIN — KETAMINE HYDROCHLORIDE 10 MG: 100 INJECTION INTRAMUSCULAR; INTRAVENOUS at 15:03

## 2020-10-15 RX ADMIN — SODIUM CHLORIDE, POTASSIUM CHLORIDE, SODIUM LACTATE AND CALCIUM CHLORIDE 9 ML/HR: 600; 310; 30; 20 INJECTION, SOLUTION INTRAVENOUS at 13:20

## 2020-10-15 RX ADMIN — IBUPROFEN 600 MG: 600 TABLET, FILM COATED ORAL at 16:15

## 2020-10-15 RX ADMIN — DEXAMETHASONE SODIUM PHOSPHATE 8 MG: 4 INJECTION, SOLUTION INTRAMUSCULAR; INTRAVENOUS at 13:21

## 2020-10-15 RX ADMIN — FENTANYL CITRATE 25 MCG: 50 INJECTION, SOLUTION INTRAMUSCULAR; INTRAVENOUS at 15:02

## 2020-10-15 RX ADMIN — PROPOFOL 150 MG: 10 INJECTION, EMULSION INTRAVENOUS at 14:35

## 2020-10-15 RX ADMIN — FENTANYL CITRATE 25 MCG: 50 INJECTION, SOLUTION INTRAMUSCULAR; INTRAVENOUS at 14:47

## 2020-10-15 RX ADMIN — ONDANSETRON 4 MG: 2 INJECTION, SOLUTION INTRAMUSCULAR; INTRAVENOUS at 13:22

## 2020-10-15 RX ADMIN — FAMOTIDINE 20 MG: 10 INJECTION INTRAVENOUS at 13:22

## 2020-10-15 RX ADMIN — SODIUM CHLORIDE, POTASSIUM CHLORIDE, SODIUM LACTATE AND CALCIUM CHLORIDE: 600; 310; 30; 20 INJECTION, SOLUTION INTRAVENOUS at 14:32

## 2020-10-15 RX ADMIN — CEFAZOLIN SODIUM 2 G: 2 SOLUTION INTRAVENOUS at 14:32

## 2020-10-15 RX ADMIN — KETAMINE HYDROCHLORIDE 20 MG: 100 INJECTION INTRAMUSCULAR; INTRAVENOUS at 14:33

## 2020-10-15 RX ADMIN — ROCURONIUM BROMIDE 2 MG: 10 INJECTION, SOLUTION INTRAVENOUS at 14:34

## 2020-10-15 RX ADMIN — DIPHENHYDRAMINE HYDROCHLORIDE 12.5 MG: 50 INJECTION, SOLUTION INTRAMUSCULAR; INTRAVENOUS at 13:23

## 2020-10-15 RX ADMIN — SUCCINYLCHOLINE CHLORIDE 100 MG: 20 INJECTION, SOLUTION INTRAMUSCULAR; INTRAVENOUS at 14:35

## 2020-10-15 NOTE — OP NOTE
Operative Repair    Pre-op Dx:  Umbilical Hernia    Post-op Dx: Same    Procedure: Open Umbilical Hernia Repair with Mesh    Surgeon:  Sylvia Guzman MD    Assistant: Cyndi Fletcher    Anesthesia: General    EBL: Minimum      Specimens:   Order Name Source Comment Collection Info Order Time   APTT   Collected By: Alena Brooke RN 10/14/2020  1:58 PM   PROTIME-INR   Collected By: Alena Brooke RN 10/14/2020  1:58 PM       Complications: None    Findings:  Umbilical Hernia    Indications: This is a pleasant 45 y.o. male patient that has a symptomatic umbilical hernia that has been increasing over time.    Procedure: After general endotracheal anesthesia, patient was prepped and draped in the usual sterile fashion.  Curvilinear infraumbilical incision was performed with an 11 blade scalpel.  The subcutaneous tissues dissected using cautery.  The hernia sac was identified and freed from the umbilicus using cautery and Metzenbaum's scissors.  The hernia sac was dissected in a circumferential manner at the base clearing 1 cm of fascia.  The sac was opened and digital examination was performed which revealed no anterior abdominal wall adhesions.  The sac was then excised using cautery.  Medium size ventralex mesh was inserted and secured in a circumferential manner with 0 Nurolon pop off sutures.  The fascia was closed over the mesh using figure-of-eight 0 Nurolon pop off sutures.  The umbilicus then was inverted and tacked to the fascia with 3-0 Vicryl.  Subcutaneous tissue was infiltrated with quarter percent Marcaine and epinephrine.  Copious irrigations was applied and meticulous hemostasis was maintained throughout the procedure.  All needles and sponge counts were correct ×2.  The skin was closed using 4-0 Vicryl.  A sterile dressing was applied and the patient was transferred to recovery room in stable condition.        Sylvia Guzman MD  General, Minimally Invasive and Endoscopic Surgery  [facility] [place]  [facility]    [address]   [address]    [address]  [address] [address]    P: [phone]  F: [phone]    Cc:  Casa Owen MD

## 2020-10-15 NOTE — ANESTHESIA PROCEDURE NOTES
Airway  Urgency: elective    Date/Time: 10/15/2020 2:37 PM  Airway not difficult    General Information and Staff    Patient location during procedure: OR  CRNA: Blaise Marie CRNA    Indications and Patient Condition  Indications for airway management: airway protection    Preoxygenated: yes  MILS maintained throughout  Mask difficulty assessment: 1 - vent by mask    Final Airway Details  Final airway type: endotracheal airway      Successful airway: ETT  Cuffed: yes   Successful intubation technique: direct laryngoscopy  Endotracheal tube insertion site: oral  Blade: Jasmyn  Blade size: 4  ETT size (mm): 7.5  Cormack-Lehane Classification: grade IIa - partial view of glottis  Placement verified by: chest auscultation and capnometry   Measured from: lips  ETT/EBT  to lips (cm): 21  Number of attempts at approach: 1  Assessment: lips, teeth, and gum same as pre-op and atraumatic intubation    Additional Comments  To OR, monitors and O2 on. Smooth IV ind. - intubated x 1 attempt through clear cords + BBS. Tape OU. Pressure points checked and padded

## 2020-10-15 NOTE — ANESTHESIA POSTPROCEDURE EVALUATION
Patient: Ra Estrada    Procedure Summary     Date: 10/15/20 Room / Location:  [facility] OR 2 / [facility]    Anesthesia Start: 1432 Anesthesia Stop: 1534    Procedure: UMBILICAL HERNIA REPAIR WITH MESH (N/A Abdomen) Diagnosis:       Recurrent umbilical hernia      (Recurrent umbilical hernia [K42.9])    Surgeon: Sylvia Guzman MD Provider: Blaise Marie CRNA    Anesthesia Type: general ASA Status: 3          Anesthesia Type: general    Vitals  Vitals Value Taken Time   /92 10/15/20 1600   Temp 97.7 °F (36.5 °C) 10/15/20 1535   Pulse 109 10/15/20 1602   Resp 15 10/15/20 1600   SpO2 93 % 10/15/20 1602   Vitals shown include unvalidated device data.        Post Anesthesia Care and Evaluation    Patient location during evaluation: bedside  Patient participation: complete - patient participated  Level of consciousness: awake and alert  Pain score: 0  Pain management: adequate  Airway patency: patent  Anesthetic complications: No anesthetic complications    Cardiovascular status: acceptable  Respiratory status: acceptable  Hydration status: acceptable

## 2020-10-15 NOTE — INTERVAL H&P NOTE
There were no vitals taken for this visit.    H&P reviewed. The patient was examined and there are no changes to the H&P.

## 2020-10-15 NOTE — ANESTHESIA PREPROCEDURE EVALUATION
Anesthesia Evaluation     Patient summary reviewed and Nursing notes reviewed   no history of anesthetic complications:  NPO Solid Status: > 8 hours  NPO Liquid Status: > 4 hours           Airway   Mallampati: II  TM distance: >3 FB  Neck ROM: full  No difficulty expected  Dental    (+) edentulous, upper dentures and lower dentures    Pulmonary - normal exam    breath sounds clear to auscultation  (+) a smoker (1 ppd) Current Abstained day of surgery,   Cardiovascular - negative cardio ROS    Rhythm: regular  Rate: abnormal        Neuro/Psych  (+) psychiatric history Anxiety,     GI/Hepatic/Renal/Endo    (+)  GERD well controlled,  hepatitis (viral hepatitis), liver disease (cirrhosis),     Musculoskeletal (-) negative ROS    Abdominal  - normal exam   Substance History   (+) drug use (off methadone 4 yerars ago)     OB/GYN          Other                        Anesthesia Plan    ASA 3     general     intravenous induction     Anesthetic plan, all risks, benefits, and alternatives have been provided, discussed and informed consent has been obtained with: patient.  Use of blood products discussed with patient  Consented to blood products.

## 2020-10-27 ENCOUNTER — OFFICE VISIT (OUTPATIENT)
Dept: SURGERY | Facility: CLINIC | Age: 45
End: 2020-10-27

## 2020-10-27 VITALS
WEIGHT: 161.5 LBS | HEART RATE: 114 BPM | OXYGEN SATURATION: 98 % | BODY MASS INDEX: 23.12 KG/M2 | HEIGHT: 70 IN | RESPIRATION RATE: 16 BRPM | TEMPERATURE: 97.8 F | SYSTOLIC BLOOD PRESSURE: 110 MMHG | DIASTOLIC BLOOD PRESSURE: 74 MMHG

## 2020-10-27 DIAGNOSIS — Z09 FOLLOW UP: Primary | ICD-10-CM

## 2020-10-27 PROCEDURE — 99024 POSTOP FOLLOW-UP VISIT: CPT | Performed by: SURGERY

## 2020-10-27 RX ORDER — OXYCODONE HYDROCHLORIDE AND ACETAMINOPHEN 5; 325 MG/1; MG/1
1 TABLET ORAL EVERY 4 HOURS PRN
Qty: 30 TABLET | Refills: 0 | Status: SHIPPED | OUTPATIENT
Start: 2020-10-27 | End: 2020-11-01

## 2020-10-27 RX ORDER — FLUTICASONE PROPIONATE 50 MCG
SPRAY, SUSPENSION (ML) NASAL
Status: ON HOLD | COMMUNITY
Start: 2020-10-09 | End: 2022-03-17

## 2020-10-27 NOTE — PROGRESS NOTES
"Chief complaint:    Chief Complaint   Patient presents with   • Post-op Hernia     2 wk hernis repair       History of Present Illness    This is Ra MENDES High 45 y.o. status post recurrent umbilical hernia with mesh and is doing very well.  Patient denies fever, chills, nausea or vomiting.  Patient's pain is well-controlled.      The following portions of the patient's history were reviewed and updated as appropriate: allergies, current medications, past family history, past medical history, past social history, past surgical history and problem list.    Vitals:    10/27/20 1040   BP: 110/74   BP Location: Left arm   Pulse: 114   Resp: 16   Temp: 97.8 °F (36.6 °C)   SpO2: 98%   Weight: 73.3 kg (161 lb 8 oz)   Height: 177.8 cm (70\")       Physical Exam  Gen.: Patient is alert and oriented ×3, no acute distress  HEENT: Normal cephalic, atraumatic, moist mucous membranes, anicteric  Incision is well-healed without evidence of infection, herniation or seroma.    Assessment/plan:    S/P recurrent umbilical hernia with mesh  I have instructed the patient not lift greater than 10 pounds for total of 6 weeks from the time of surgery.   I have instructed the patient follow-up as needed.    Sylvia Guzman MD  General, Minimally Invasive and Endoscopic Surgery  [facility]    [address] [facility] [address] [place] [address]    [address]  [address] [address]    P: [phone]  F: [phone]    Cc:  Casa Owen MD  Answers for HPI/ROS submitted by the patient on 10/20/2020   Abdominal pain  What is the primary reason for your visit?: Abdominal Pain    "

## 2020-11-03 RX ORDER — ONDANSETRON 4 MG/1
4 TABLET, FILM COATED ORAL EVERY 4 HOURS PRN
Qty: 30 TABLET | Refills: 1 | Status: SHIPPED | OUTPATIENT
Start: 2020-11-03 | End: 2020-11-30

## 2020-11-30 RX ORDER — ONDANSETRON 4 MG/1
TABLET, FILM COATED ORAL
Qty: 30 TABLET | Refills: 1 | Status: SHIPPED | OUTPATIENT
Start: 2020-11-30 | End: 2021-01-14

## 2020-12-15 ENCOUNTER — OFFICE VISIT (OUTPATIENT)
Dept: SURGERY | Facility: CLINIC | Age: 45
End: 2020-12-15

## 2020-12-15 VITALS
HEIGHT: 70 IN | DIASTOLIC BLOOD PRESSURE: 62 MMHG | SYSTOLIC BLOOD PRESSURE: 114 MMHG | OXYGEN SATURATION: 98 % | WEIGHT: 168.9 LBS | BODY MASS INDEX: 24.18 KG/M2 | HEART RATE: 89 BPM

## 2020-12-15 DIAGNOSIS — Z09 FOLLOW UP: Primary | ICD-10-CM

## 2020-12-15 PROBLEM — I85.10 SECONDARY ESOPHAGEAL VARICES (HCC): Status: ACTIVE | Noted: 2019-08-25

## 2020-12-15 PROBLEM — F17.210 TOBACCO DEPENDENCE DUE TO CIGARETTES: Status: ACTIVE | Noted: 2019-08-25

## 2020-12-15 PROBLEM — S72.001A CLOSED DISPLACED FRACTURE OF RIGHT FEMORAL NECK (HCC): Status: ACTIVE | Noted: 2019-08-24

## 2020-12-15 PROBLEM — K21.9 GASTROESOPHAGEAL REFLUX DISEASE: Status: ACTIVE | Noted: 2020-12-15

## 2020-12-15 PROBLEM — B19.20 HEPATITIS C VIRUS INFECTION: Status: ACTIVE | Noted: 2020-12-15

## 2020-12-15 PROCEDURE — 99024 POSTOP FOLLOW-UP VISIT: CPT | Performed by: SURGERY

## 2020-12-15 RX ORDER — POTASSIUM CHLORIDE 750 MG/1
10 TABLET, FILM COATED, EXTENDED RELEASE ORAL DAILY
COMMUNITY
Start: 2020-11-27

## 2020-12-15 RX ORDER — ROPINIROLE 0.25 MG/1
TABLET, FILM COATED ORAL
Status: ON HOLD | COMMUNITY
Start: 2020-12-02 | End: 2022-03-17

## 2020-12-15 NOTE — PROGRESS NOTES
Chief Complaint   Patient presents with   • Post-op Follow-up     umb hernia- pain with bulging        Patient is a 45 y.o. male established patient who had a recurrent umbilical hernia repair in October.  Patient noticed some bulging and pain around the umbilicus.  Patient reports it still there and when he lays down and is unable to push it in.    Past Medical History:   Diagnosis Date   • Anxiety    • Breast asymmetry 08/2019    Right lump   • Cirrhosis (CMS/HCC)    • Esophageal varix (CMS/HCC)     Multiple bandings, Dr Giraldo [facility] follows, has bandings every 2 months   • GERD (gastroesophageal reflux disease)    • History of cirrhosis     Stage III/Dr. Giraldo [facility]   • History of GI bleed    • History of kidney stones    • History of transfusion    • Hx of ascites    • Hypotestosteronism    • Infectious viral hepatitis     2010   • Jaundice    • Substance abuse (CMS/HCC)     H/O drug use, sees substance abuse doctor   • Thrombocytopenia (CMS/HCC)     sees Dr Jay, last 9/2020   • Umbilical hernia     sched repair     Past Surgical History:   Procedure Laterality Date   • APPENDECTOMY     • ENDOSCOPY     • ENDOSCOPY W/ BANDING      multiple, last 9/25/20   • ESOPHAGUS SURGERY      Banding more than 10 times   • ORIF FEMUR FRACTURE Right 10/2019   • PITUITARY SURGERY      drained   • SHOULDER ARTHROSCOPY Right 1995    Rotator cuff   • UMBILICAL HERNIA REPAIR N/A 8/13/2020    Procedure: UMBILICAL HERNIA REPAIR;  Surgeon: Sylvia Guzman MD;  Location:  [facility] OR;  Service: General;  Laterality: N/A;   • UMBILICAL HERNIA REPAIR N/A 10/15/2020    Procedure: UMBILICAL HERNIA REPAIR WITH MESH;  Surgeon: Sylvia Guzman MD;  Location:  [facility] OR;  Service: General;  Laterality: N/A;     Family History   Problem Relation Age of Onset   • Coronary artery disease Father    • Malig Hyperthermia Neg Hx      Social History     Tobacco Use   • Smoking status: Current Every Day Smoker     Packs/day: 1.00     Years: 25.00      Pack years: 25.00     Types: Cigarettes   • Smokeless tobacco: Never Used   Substance Use Topics   • Alcohol use: Not Currently     Comment: last 7/2020   • Drug use: Yes     Frequency: 3.0 times per week     Types: Marijuana     Comment: methadone hx  no use sine 2016-  marijuana use 10/14/20     No Known Allergies    Current Outpatient Medications:   •  Ferrous Sulfate (IRON PO), Take 1 tablet by mouth Daily., Disp: , Rfl:   •  fluticasone (FLONASE) 50 MCG/ACT nasal spray, SHAKE LQ AND U 2 SPRAYS IEN D, Disp: , Rfl:   •  furosemide (LASIX) 40 MG tablet, Take 40 mg by mouth Daily., Disp: , Rfl:   •  MAGNESIUM PO, Take 1 tablet by mouth Daily., Disp: , Rfl:   •  omeprazole (priLOSEC) 20 MG capsule, Take 20 mg by mouth Daily., Disp: , Rfl:   •  ondansetron (ZOFRAN) 4 MG tablet, TAKE ONE TABLET BY MOUTH EVERY 4 HOURS AS NEEDED, Disp: 30 tablet, Rfl: 1  •  POTASSIUM PO, Take  by mouth As Needed (cramps). OTC, Disp: , Rfl:   •  spironolactone (ALDACTONE) 100 MG tablet, Take 100 mg by mouth Daily., Disp: , Rfl:   •  traZODone (DESYREL) 50 MG tablet, Take 50 mg by mouth Every Night., Disp: , Rfl:   •  vitamin B-12 (CYANOCOBALAMIN) 1000 MCG tablet, Take  by mouth Daily., Disp: , Rfl:   •  potassium chloride 10 MEQ CR tablet, Take 10 mEq by mouth Daily., Disp: , Rfl:   •  rOPINIRole (REQUIP) 0.25 MG tablet, TK 1 T PO HS, Disp: , Rfl:     Review of Systems  General: Patient reports during good health  Eyes: No eye problems  Ears, nose, mouth and throat: No rhinitis, no hearing problems, no chronic cough  Cardiovascular/heart: Denies palpitations, syncope or chest pain  Respiratory/lung: Denies shortness of breath, hemoptysis, dyspnea on exertion   Genital/urinary: No frequency, hematuria or dysuria  Hematological/lymphatic: Denies anemia or other problems  Musculoskeletal: No joint pain, no defects  Skin: No psoriasis or other skin issues  Neurological: No seizures or other neurological problems  Psychiatric:  "Anxiety  Endocrine: Negative  Gastro-intestinal: No constipation, no diarrhea, no melena, no hematochezia, see HPI  Vitals:    12/15/20 1003   BP: 114/62   BP Location: Left arm   Patient Position: Sitting   Cuff Size: Adult   Pulse: 89   SpO2: 98%   Weight: 76.6 kg (168 lb 14.4 oz)   Height: 177.8 cm (70\")       Physical Exam  General/physcological:   Alert and oriented x3, in no acute distress  HEENT: Normal cephalic, atraumatic, PERRLA, EOMI, sclera anicteric, moist mucous membranes, neck is supple, no JVD, no carotid bruits, no thyromegaly no adenopathy  Respiratory: CTA and percussion  CVA: RRR, normal S1-S2, no murmurs, no gallops or rubs  GI: Positive BS, soft, nondistended, nontender, no rebound, no guarding, no organomegaly and no masses  Patient's previous umbilical incision appears well-healed and the patient appears to have a seroma, I have aspirated 30 cc of clear fluid  Musculoskeletal: Moves all 4 ext, no clubbing, no cyanosis or edema  Neurovascular: Grossly intact  Debilities: none  Emotional behavior: appropriate     Patient does use tobacco products currently.     Assessment:  Status post recurrent umbilical hernia repair with a seroma  Plan:  Seroma was drained today in the office patient will follow-up as needed.    Sylvia Guzman MD  General, Minimally Invasive and Endoscopic Surgery  [facility]      [address] [facility] [address] [place] [address]    [address]  [address] [address]    P: [phone]  F: [phone]    Cc:  Casa Owen MD      "

## 2020-12-29 ENCOUNTER — OFFICE VISIT (OUTPATIENT)
Dept: SURGERY | Facility: CLINIC | Age: 45
End: 2020-12-29

## 2020-12-29 VITALS
OXYGEN SATURATION: 98 % | HEIGHT: 70 IN | HEART RATE: 104 BPM | DIASTOLIC BLOOD PRESSURE: 82 MMHG | BODY MASS INDEX: 24.01 KG/M2 | SYSTOLIC BLOOD PRESSURE: 124 MMHG | WEIGHT: 167.7 LBS

## 2020-12-29 DIAGNOSIS — S30.1XXD ABDOMINAL WALL SEROMA, SUBSEQUENT ENCOUNTER: Primary | ICD-10-CM

## 2020-12-29 PROCEDURE — 99212 OFFICE O/P EST SF 10 MIN: CPT | Performed by: SURGERY

## 2020-12-29 NOTE — PROGRESS NOTES
Chief Complaint   Patient presents with   • Post-op     drain hernia bulge        Patient is a 45 y.o. male established patient who had a seroma drained on 12/15/20 in the office. Presenting today for a wound check. Pt believes it is resolving, no fever or chills. Pain is minimal.     Past Medical History:   Diagnosis Date   • Anxiety    • Breast asymmetry 08/2019    Right lump   • Cirrhosis (CMS/HCC)    • Esophageal varix (CMS/HCC)     Multiple bandings, Dr Giraldo [facility] follows, has bandings every 2 months   • GERD (gastroesophageal reflux disease)    • History of cirrhosis     Stage III/Dr. Giraldo [facility]   • History of GI bleed    • History of kidney stones    • History of transfusion    • Hx of ascites    • Hypotestosteronism    • Infectious viral hepatitis     2010   • Jaundice    • Substance abuse (CMS/HCC)     H/O drug use, sees substance abuse doctor   • Thrombocytopenia (CMS/HCC)     sees Dr Jay, last 9/2020   • Umbilical hernia     sched repair     Past Surgical History:   Procedure Laterality Date   • APPENDECTOMY     • ENDOSCOPY     • ENDOSCOPY W/ BANDING      multiple, last 9/25/20   • ESOPHAGUS SURGERY      Banding more than 10 times   • ORIF FEMUR FRACTURE Right 10/2019   • PITUITARY SURGERY      drained   • SHOULDER ARTHROSCOPY Right 1995    Rotator cuff   • UMBILICAL HERNIA REPAIR N/A 8/13/2020    Procedure: UMBILICAL HERNIA REPAIR;  Surgeon: Sylvia Guzman MD;  Location:  [facility];  Service: General;  Laterality: N/A;   • UMBILICAL HERNIA REPAIR N/A 10/15/2020    Procedure: UMBILICAL HERNIA REPAIR WITH MESH;  Surgeon: Sylvia Guzman MD;  Location:  [facility];  Service: General;  Laterality: N/A;     Family History   Problem Relation Age of Onset   • Coronary artery disease Father    • Malig Hyperthermia Neg Hx      Social History     Tobacco Use   • Smoking status: Current Every Day Smoker     Packs/day: 1.00     Years: 25.00     Pack years: 25.00     Types: Cigarettes   • Smokeless tobacco:  "Never Used   Substance Use Topics   • Alcohol use: Not Currently     Comment: last 7/2020   • Drug use: Yes     Frequency: 3.0 times per week     Types: Marijuana     Comment: methadone hx  no use sine 2016-  marijuana use 10/14/20     No Known Allergies    Current Outpatient Medications:   •  Ferrous Sulfate (IRON PO), Take 1 tablet by mouth Daily., Disp: , Rfl:   •  fluticasone (FLONASE) 50 MCG/ACT nasal spray, SHAKE LQ AND U 2 SPRAYS IEN D, Disp: , Rfl:   •  furosemide (LASIX) 40 MG tablet, Take 40 mg by mouth Daily., Disp: , Rfl:   •  MAGNESIUM PO, Take 1 tablet by mouth Daily., Disp: , Rfl:   •  omeprazole (priLOSEC) 20 MG capsule, Take 20 mg by mouth Daily., Disp: , Rfl:   •  ondansetron (ZOFRAN) 4 MG tablet, TAKE ONE TABLET BY MOUTH EVERY 4 HOURS AS NEEDED, Disp: 30 tablet, Rfl: 1  •  potassium chloride 10 MEQ CR tablet, Take 10 mEq by mouth Daily., Disp: , Rfl:   •  POTASSIUM PO, Take  by mouth As Needed (cramps). OTC, Disp: , Rfl:   •  rOPINIRole (REQUIP) 0.25 MG tablet, TK 1 T PO HS, Disp: , Rfl:   •  spironolactone (ALDACTONE) 100 MG tablet, Take 100 mg by mouth Daily., Disp: , Rfl:   •  traZODone (DESYREL) 50 MG tablet, Take 50 mg by mouth Every Night., Disp: , Rfl:   •  vitamin B-12 (CYANOCOBALAMIN) 1000 MCG tablet, Take  by mouth Daily., Disp: , Rfl:     Review of Systems   unchanged   Vitals:    12/29/20 0926   BP: 124/82   BP Location: Right arm   Patient Position: Sitting   Cuff Size: Adult   Pulse: 104   SpO2: 98%   Weight: 76.1 kg (167 lb 11.2 oz)   Height: 177.8 cm (70\")       Physical Exam  General/physcological:   Alert and oriented x3, in no acute distress  HEENT: Normal cephalic, atraumatic, PERRLA, EOMI, sclera anicteric, moist mucous membranes, neck is supple, no JVD, no carotid bruits, no thyromegaly no adenopathy  Respiratory: CTA and percussion  CVA: RRR, normal S1-S2, no murmurs, no gallops or rubs  GI: Positive BS, soft, nondistended, nontender, no rebound, no guarding, no hernias, no " organomegaly and no masses  Decreased seroma   Musculoskeletal: Moves all 4 ext, no clubbing, no cyanosis or edema  Neurovascular: Grossly intact  Debilities: none  Emotional behavior: appropriate     Assessment:  S/p umbilical hernia repair with seroma   Plan:  Seroma drained on 12/15/20, minimal recurrence which is getting smaller on its own.   Patient will f/u as needed.    Sylvia Guzman MD  General, Minimally Invasive and Endoscopic Surgery  [facility]      [address] [facility] [address] [place] [address] [address]    [address]  [address] [address]    P: [phone]  F: [phone]    Cc:  Casa Owen MD

## 2021-01-14 RX ORDER — ONDANSETRON 4 MG/1
TABLET, FILM COATED ORAL
Qty: 30 TABLET | Refills: 1 | Status: SHIPPED | OUTPATIENT
Start: 2021-01-14 | End: 2021-03-23

## 2021-01-19 ENCOUNTER — TRANSCRIBE ORDERS (OUTPATIENT)
Dept: ADMINISTRATIVE | Facility: HOSPITAL | Age: 46
End: 2021-01-19

## 2021-01-19 ENCOUNTER — HOSPITAL ENCOUNTER (OUTPATIENT)
Dept: CT IMAGING | Facility: HOSPITAL | Age: 46
Discharge: HOME | End: 2021-01-19
Admitting: FAMILY MEDICINE

## 2021-01-19 DIAGNOSIS — R05.9 COUGH: ICD-10-CM

## 2021-01-19 DIAGNOSIS — R05.9 COUGH: Primary | ICD-10-CM

## 2021-01-19 LAB — CREAT BLDA-MCNC: 0.9 MG/DL (ref 0.6–1.3)

## 2021-01-19 PROCEDURE — 25010000002 IOPAMIDOL 61 % SOLUTION: Performed by: FAMILY MEDICINE

## 2021-01-19 PROCEDURE — 82565 ASSAY OF CREATININE: CPT

## 2021-01-19 PROCEDURE — 71260 CT THORAX DX C+: CPT

## 2021-01-19 RX ADMIN — IOPAMIDOL 75 ML: 612 INJECTION, SOLUTION INTRAVENOUS at 16:48

## 2021-02-02 ENCOUNTER — OFFICE VISIT (OUTPATIENT)
Dept: SURGERY | Facility: CLINIC | Age: 46
End: 2021-02-02

## 2021-02-02 VITALS
OXYGEN SATURATION: 99 % | DIASTOLIC BLOOD PRESSURE: 80 MMHG | HEIGHT: 70 IN | WEIGHT: 170.7 LBS | SYSTOLIC BLOOD PRESSURE: 120 MMHG | HEART RATE: 97 BPM | BODY MASS INDEX: 24.44 KG/M2

## 2021-02-02 DIAGNOSIS — S30.1XXA ABDOMINAL WALL SEROMA, INITIAL ENCOUNTER: Primary | ICD-10-CM

## 2021-02-02 PROCEDURE — 99212 OFFICE O/P EST SF 10 MIN: CPT | Performed by: SURGERY

## 2021-02-02 NOTE — PROGRESS NOTES
CC: periumbilical swelling      Patient is a 45 y.o. male that has ascites due to cirrhosis.  Patient had an umbilical hernia repair with mesh several months ago and develops fluid between the mesh and abdominal wall when his ascites is up.  Patient denies any fever, chills, nausea or vomiting.    Past Medical History:   Diagnosis Date   • Anxiety    • Breast asymmetry 08/2019    Right lump   • Cirrhosis (CMS/HCC)    • Esophageal varix (CMS/HCC)     Multiple bandings, Dr Giraldo [facility] follows, has bandings every 2 months   • GERD (gastroesophageal reflux disease)    • History of cirrhosis     Stage III/Dr. Giraldo [facility]   • History of GI bleed    • History of kidney stones    • History of transfusion    • Hx of ascites    • Hypotestosteronism    • Infectious viral hepatitis     2010   • Jaundice    • Substance abuse (CMS/HCC)     H/O drug use, sees substance abuse doctor   • Thrombocytopenia (CMS/HCC)     sees Dr Jay, last 9/2020   • Umbilical hernia     sched repair     Past Surgical History:   Procedure Laterality Date   • APPENDECTOMY     • ENDOSCOPY     • ENDOSCOPY W/ BANDING      multiple, last 9/25/20   • ESOPHAGUS SURGERY      Banding more than 10 times   • ORIF FEMUR FRACTURE Right 10/2019   • PITUITARY SURGERY      drained   • SHOULDER ARTHROSCOPY Right 1995    Rotator cuff   • UMBILICAL HERNIA REPAIR N/A 8/13/2020    Procedure: UMBILICAL HERNIA REPAIR;  Surgeon: Sylvia Guzman MD;  Location:  [facility];  Service: General;  Laterality: N/A;   • UMBILICAL HERNIA REPAIR N/A 10/15/2020    Procedure: UMBILICAL HERNIA REPAIR WITH MESH;  Surgeon: Sylvia Guzman MD;  Location:  [facility];  Service: General;  Laterality: N/A;     Family History   Problem Relation Age of Onset   • Coronary artery disease Father    • Malig Hyperthermia Neg Hx      Social History     Tobacco Use   • Smoking status: Current Every Day Smoker     Packs/day: 1.00     Years: 25.00     Pack years: 25.00     Types: Cigarettes   •  Smokeless tobacco: Never Used   Substance Use Topics   • Alcohol use: Not Currently     Comment: last 7/2020   • Drug use: Yes     Frequency: 3.0 times per week     Types: Marijuana     Comment: methadone hx  no use sine 2016-  marijuana use 10/14/20     No Known Allergies    Current Outpatient Medications:   •  Ferrous Sulfate (IRON PO), Take 1 tablet by mouth Daily., Disp: , Rfl:   •  fluticasone (FLONASE) 50 MCG/ACT nasal spray, SHAKE LQ AND U 2 SPRAYS IEN D, Disp: , Rfl:   •  furosemide (LASIX) 40 MG tablet, Take 40 mg by mouth Daily., Disp: , Rfl:   •  MAGNESIUM PO, Take 1 tablet by mouth Daily., Disp: , Rfl:   •  omeprazole (priLOSEC) 20 MG capsule, Take 20 mg by mouth Daily., Disp: , Rfl:   •  ondansetron (ZOFRAN) 4 MG tablet, TAKE 1 TABLET BY MOUTH EVERY 4 HOURS AS NEEDED, Disp: 30 tablet, Rfl: 1  •  potassium chloride 10 MEQ CR tablet, Take 10 mEq by mouth Daily., Disp: , Rfl:   •  POTASSIUM PO, Take  by mouth As Needed (cramps). OTC, Disp: , Rfl:   •  rOPINIRole (REQUIP) 0.25 MG tablet, TK 1 T PO HS, Disp: , Rfl:   •  spironolactone (ALDACTONE) 100 MG tablet, Take 100 mg by mouth Daily., Disp: , Rfl:   •  traZODone (DESYREL) 50 MG tablet, Take 50 mg by mouth Every Night., Disp: , Rfl:   •  vitamin B-12 (CYANOCOBALAMIN) 1000 MCG tablet, Take  by mouth Daily., Disp: , Rfl:     Review of Systems  General: Patient reports during good health  Eyes: No eye problems  Ears, nose, mouth and throat: No rhinitis, no hearing problems, no chronic cough  Cardiovascular/heart: Denies palpitations, syncope or chest pain  Respiratory/lung: Denies shortness of breath, hemoptysis, dyspnea on exertion   Genital/urinary: No frequency, hematuria or dysuria  Hematological/lymphatic: Denies anemia or other problems  Musculoskeletal: Positive joint pain, back pain and muscle pain  Skin: No psoriasis or other skin issues  Neurological: No seizures or other neurological problems  Psychiatric: None  Endocrine:  "Negative  Gastro-intestinal: No constipation, no diarrhea, no melena, no hematochezia  Vitals:    02/02/21 0824   BP: 120/80   BP Location: Left arm   Patient Position: Sitting   Cuff Size: Adult   Pulse: 97   SpO2: 99%   Weight: 77.4 kg (170 lb 11.2 oz)   Height: 177.8 cm (70\")       Physical Exam  General/physcological:   Alert and oriented x3, in no acute distress  HEENT: Normal cephalic, atraumatic, PERRLA, EOMI, sclera anicteric, moist mucous membranes, neck is supple, no JVD, no carotid bruits, no thyromegaly no adenopathy  Respiratory: CTA and percussion  CVA: RRR, normal S1-S2, no murmurs, no gallops or rubs  GI: Positive BS, soft, nondistended, nontender, no rebound, no guarding, no hernias, seroma periumbilical, no organomegaly and no masses  Musculoskeletal: Moves all 4 ext, no clubbing, no cyanosis or edema  Neurovascular: Grossly intact  Debilities: none  Emotional behavior: appropriate     Assessment:  Periumbilical seroma  Plan:  I have drained 30 cc of serous fluid.  I have advised patient follow-up as needed    Sylvia Guzman MD  General, Minimally Invasive and Endoscopic Surgery  [facility] [place] [id number] [facility] [address] [place] [address]    [address]  [place], [place] [address] [place] [address]    P: [phone]  F: [phone]    Cc:  Casa Owen MD      "

## 2021-03-23 RX ORDER — ONDANSETRON 4 MG/1
TABLET, FILM COATED ORAL
Qty: 30 TABLET | Refills: 1 | Status: SHIPPED | OUTPATIENT
Start: 2021-03-23 | End: 2021-12-13

## 2021-03-31 ENCOUNTER — BULK ORDERING (OUTPATIENT)
Dept: CASE MANAGEMENT | Facility: OTHER | Age: 46
End: 2021-03-31

## 2021-03-31 DIAGNOSIS — Z23 IMMUNIZATION DUE: ICD-10-CM

## 2021-05-11 ENCOUNTER — OFFICE VISIT (OUTPATIENT)
Dept: SURGERY | Facility: CLINIC | Age: 46
End: 2021-05-11

## 2021-05-11 VITALS
HEART RATE: 98 BPM | OXYGEN SATURATION: 99 % | DIASTOLIC BLOOD PRESSURE: 76 MMHG | TEMPERATURE: 98 F | BODY MASS INDEX: 24.61 KG/M2 | WEIGHT: 171.9 LBS | SYSTOLIC BLOOD PRESSURE: 120 MMHG | RESPIRATION RATE: 18 BRPM | HEIGHT: 70 IN

## 2021-05-11 DIAGNOSIS — S30.1XXD ABDOMINAL WALL SEROMA, SUBSEQUENT ENCOUNTER: Primary | ICD-10-CM

## 2021-05-11 PROCEDURE — 99213 OFFICE O/P EST LOW 20 MIN: CPT | Performed by: SURGERY

## 2021-05-11 RX ORDER — CALCIUM CARBONATE/VITAMIN D3 500MG-5MCG
TABLET ORAL
COMMUNITY
Start: 2021-04-25

## 2021-05-11 NOTE — PROGRESS NOTES
Chief Complaint   Patient presents with   • Follow-up     Abdominal wall seroma DRAINED        Patient is a 45 y.o. male established patient that has ascites and an umbilical hernia repair with a chronic reoccurring seroma.  Patient is here today to have the seroma drained.  Patient denies fever, chills, nausea or vomiting.    Past Medical History:   Diagnosis Date   • Anxiety    • Breast asymmetry 08/2019    Right lump   • Cirrhosis (CMS/HCC)    • Esophageal varix (CMS/HCC)     Multiple bandings, Dr Giraldo [facility] follows, has bandings every 2 months   • GERD (gastroesophageal reflux disease)    • History of cirrhosis     Stage III/Dr. Giraldo [facility]   • History of GI bleed    • History of kidney stones    • History of transfusion    • Hx of ascites    • Hypotestosteronism    • Infectious viral hepatitis     2010   • Jaundice    • Substance abuse (CMS/HCC)     H/O drug use, sees substance abuse doctor   • Thrombocytopenia (CMS/HCC)     sees Dr Jay, last 9/2020   • Umbilical hernia     sched repair     Past Surgical History:   Procedure Laterality Date   • APPENDECTOMY     • ENDOSCOPY     • ENDOSCOPY W/ BANDING      multiple, last 9/25/20   • ESOPHAGUS SURGERY      Banding more than 10 times   • ORIF FEMUR FRACTURE Right 10/2019   • PITUITARY SURGERY      drained   • SHOULDER ARTHROSCOPY Right 1995    Rotator cuff   • UMBILICAL HERNIA REPAIR N/A 8/13/2020    Procedure: UMBILICAL HERNIA REPAIR;  Surgeon: Sylvia Guzman MD;  Location:  [facility];  Service: General;  Laterality: N/A;   • UMBILICAL HERNIA REPAIR N/A 10/15/2020    Procedure: UMBILICAL HERNIA REPAIR WITH MESH;  Surgeon: Sylvia Guzman MD;  Location:  [facility];  Service: General;  Laterality: N/A;     Family History   Problem Relation Age of Onset   • Coronary artery disease Father    • Malig Hyperthermia Neg Hx      Social History     Tobacco Use   • Smoking status: Current Every Day Smoker     Packs/day: 1.00     Years: 25.00     Pack years: 25.00      "Types: Cigarettes   • Smokeless tobacco: Never Used   Vaping Use   • Vaping Use: Never used   Substance Use Topics   • Alcohol use: Not Currently     Comment: last 7/2020   • Drug use: Yes     Frequency: 3.0 times per week     Types: Marijuana     Comment: methadone hx  no use sine 2016-  marijuana use 10/14/20     No Known Allergies    Current Outpatient Medications:   •  Ferrous Sulfate (IRON PO), Take 1 tablet by mouth Daily., Disp: , Rfl:   •  fluticasone (FLONASE) 50 MCG/ACT nasal spray, SHAKE LQ AND U 2 SPRAYS IEN D, Disp: , Rfl:   •  furosemide (LASIX) 40 MG tablet, Take 40 mg by mouth Daily., Disp: , Rfl:   •  MAGNESIUM PO, Take 1 tablet by mouth Daily., Disp: , Rfl:   •  omeprazole (priLOSEC) 20 MG capsule, Take 20 mg by mouth Daily., Disp: , Rfl:   •  OYSCO 500 + D 500-200 MG-UNIT tablet, TAKE 1 TABLET BY MOUTH 1 TIME EACH DAY, Disp: , Rfl:   •  potassium chloride 10 MEQ CR tablet, Take 10 mEq by mouth Daily., Disp: , Rfl:   •  POTASSIUM PO, Take  by mouth As Needed (cramps). OTC, Disp: , Rfl:   •  spironolactone (ALDACTONE) 100 MG tablet, Take 100 mg by mouth Daily., Disp: , Rfl:   •  traZODone (DESYREL) 50 MG tablet, Take 50 mg by mouth Every Night., Disp: , Rfl:   •  vitamin B-12 (CYANOCOBALAMIN) 1000 MCG tablet, Take  by mouth Daily., Disp: , Rfl:   •  ondansetron (ZOFRAN) 4 MG tablet, TAKE 1 TABLET BY MOUTH EVERY 4 HOURS AS NEEDED, Disp: 30 tablet, Rfl: 1  •  rOPINIRole (REQUIP) 0.25 MG tablet, TK 1 T PO HS, Disp: , Rfl:     Review of Systems  Unchanged  Vitals:    05/11/21 1528   BP: 120/76   BP Location: Left arm   Patient Position: Sitting   Cuff Size: Adult   Pulse: 98   Resp: 18   Temp: 98 °F (36.7 °C)   TempSrc: Temporal   SpO2: 99%   Weight: 78 kg (171 lb 14.4 oz)   Height: 177.8 cm (70\")       Physical Exam  General/physcological:   Alert and oriented x3, in no acute distress  HEENT: Normal cephalic, atraumatic, PERRLA, EOMI, sclera anicteric, moist mucous membranes, neck is supple, no JVD, no " carotid bruits, no thyromegaly no adenopathy  Respiratory: CTA and percussion  CVA: RRR, normal S1-S2, no murmurs, no gallops or rubs  GI: Positive BS, soft, nondistended, nontender, no rebound, no guarding, no hernias, no organomegaly and no masses  Musculoskeletal: Moves all 4 ext, no clubbing, no cyanosis or edema  Neurovascular: Grossly intact  Debilities: none  Emotional behavior: appropriate     Patient does use tobacco products currently.  I have advised patient to stop using tobacco products    Assessment:  Seroma  Plan:  I have drained 30 cc from his seroma which is clear.  Patient will follow up as needed.    Sylvia Guzman MD  General, Minimally Invasive and Endoscopic Surgery  [facility]      [address] [facility] [address] [place] [address] [address]    [address]  [address] [address]    P: [phone]  F: [phone]    Cc:  Casa Owen MD

## 2021-07-29 PROCEDURE — 88305 TISSUE EXAM BY PATHOLOGIST: CPT | Performed by: UROLOGY

## 2021-07-30 ENCOUNTER — LAB REQUISITION (OUTPATIENT)
Dept: LAB | Facility: HOSPITAL | Age: 46
End: 2021-07-30

## 2021-07-30 DIAGNOSIS — A63.0 ANOGENITAL (VENEREAL) WARTS: ICD-10-CM

## 2021-08-02 LAB
LAB AP CASE REPORT: NORMAL
PATH REPORT.FINAL DX SPEC: NORMAL
PATH REPORT.GROSS SPEC: NORMAL

## 2021-10-12 ENCOUNTER — OFFICE VISIT (OUTPATIENT)
Dept: SURGERY | Facility: CLINIC | Age: 46
End: 2021-10-12

## 2021-10-12 VITALS
RESPIRATION RATE: 18 BRPM | BODY MASS INDEX: 26.39 KG/M2 | HEART RATE: 96 BPM | WEIGHT: 184.3 LBS | DIASTOLIC BLOOD PRESSURE: 74 MMHG | HEIGHT: 70 IN | TEMPERATURE: 98 F | SYSTOLIC BLOOD PRESSURE: 116 MMHG | OXYGEN SATURATION: 98 %

## 2021-10-12 DIAGNOSIS — S30.1XXD ABDOMINAL WALL SEROMA, SUBSEQUENT ENCOUNTER: Primary | ICD-10-CM

## 2021-10-12 PROCEDURE — 99212 OFFICE O/P EST SF 10 MIN: CPT | Performed by: SURGERY

## 2021-10-12 NOTE — PROGRESS NOTES
"Chief complaint:    Chief Complaint   Patient presents with   • abdominal wall seroma, subsequent encounter       History of Present Illness    This is Ra MENDES High 46 y.o. status post umbilical hernia repair and has abdominal ascites with cirrhosis.  Patient has developed fluid between the mesh and anterior abdominal wall.  Patient denies fever, chills, nausea or vomiting.  Patient's pain is well-controlled.      The following portions of the patient's history were reviewed and updated as appropriate: allergies, current medications, past family history, past medical history, past social history, past surgical history and problem list.    Vitals:    10/12/21 1446   Weight: 83.6 kg (184 lb 4.8 oz)   Height: 177.8 cm (70\")       Physical Exam  Gen.: Patient is alert and oriented ×3, no acute distress  HEENT: Normal cephalic, atraumatic, moist mucous membranes, anicteric  Abdomen: Positive fluctuant around the periumbilical area, no evidence of erythema, no evidence of a recurrent hernia    Assessment/plan:  Ascites with accumulation of fluid in his hernia repair  8 cc aspirated     I have instructed the patient follow-up as needed.    Sylvia Guzman MD  General, Minimally Invasive and Endoscopic Surgery  [facility]    [address] [facility] [address] [facility]   [address]    [address]  [place] [address]               [place] [address]    P: [phone]  F: [phone]    Cc:  Casa Owen MD  "

## 2021-12-13 RX ORDER — ONDANSETRON 4 MG/1
TABLET, FILM COATED ORAL
Qty: 30 TABLET | Refills: 1 | Status: SHIPPED | OUTPATIENT
Start: 2021-12-13

## 2022-02-23 ENCOUNTER — APPOINTMENT (OUTPATIENT)
Dept: GENERAL RADIOLOGY | Facility: HOSPITAL | Age: 47
End: 2022-02-23

## 2022-02-23 ENCOUNTER — HOSPITAL ENCOUNTER (EMERGENCY)
Facility: HOSPITAL | Age: 47
Discharge: HOME | End: 2022-02-23
Attending: EMERGENCY MEDICINE | Admitting: EMERGENCY MEDICINE

## 2022-02-23 VITALS
HEIGHT: 70 IN | WEIGHT: 189.38 LBS | TEMPERATURE: 97.9 F | DIASTOLIC BLOOD PRESSURE: 78 MMHG | RESPIRATION RATE: 18 BRPM | HEART RATE: 89 BPM | BODY MASS INDEX: 27.11 KG/M2 | OXYGEN SATURATION: 97 % | SYSTOLIC BLOOD PRESSURE: 113 MMHG

## 2022-02-23 DIAGNOSIS — J90 PLEURAL EFFUSION ON LEFT: ICD-10-CM

## 2022-02-23 DIAGNOSIS — R18.8 OTHER ASCITES: Primary | ICD-10-CM

## 2022-02-23 LAB
ALBUMIN SERPL-MCNC: 3.7 G/DL (ref 3.5–5.2)
ALBUMIN/GLOB SERPL: 1 G/DL
ALP SERPL-CCNC: 157 U/L (ref 39–117)
ALT SERPL W P-5'-P-CCNC: 61 U/L (ref 1–41)
ANION GAP SERPL CALCULATED.3IONS-SCNC: 10.2 MMOL/L (ref 5–15)
AST SERPL-CCNC: 50 U/L (ref 1–40)
BASOPHILS # BLD AUTO: 0.03 10*3/MM3 (ref 0–0.2)
BASOPHILS NFR BLD AUTO: 0.4 % (ref 0–1.5)
BILIRUB SERPL-MCNC: 1.1 MG/DL (ref 0–1.2)
BUN SERPL-MCNC: 8 MG/DL (ref 6–20)
BUN/CREAT SERPL: 11.4 (ref 7–25)
CALCIUM SPEC-SCNC: 8.8 MG/DL (ref 8.6–10.5)
CHLORIDE SERPL-SCNC: 105 MMOL/L (ref 98–107)
CO2 SERPL-SCNC: 23.8 MMOL/L (ref 22–29)
CREAT SERPL-MCNC: 0.7 MG/DL (ref 0.76–1.27)
DEPRECATED RDW RBC AUTO: 59 FL (ref 37–54)
EOSINOPHIL # BLD AUTO: 0.07 10*3/MM3 (ref 0–0.4)
EOSINOPHIL NFR BLD AUTO: 1 % (ref 0.3–6.2)
ERYTHROCYTE [DISTWIDTH] IN BLOOD BY AUTOMATED COUNT: 16.6 % (ref 12.3–15.4)
GFR SERPL CREATININE-BSD FRML MDRD: 121 ML/MIN/1.73
GLOBULIN UR ELPH-MCNC: 3.6 GM/DL
GLUCOSE SERPL-MCNC: 105 MG/DL (ref 65–99)
HCT VFR BLD AUTO: 37.9 % (ref 37.5–51)
HGB BLD-MCNC: 12.6 G/DL (ref 13–17.7)
HOLD SPECIMEN: NORMAL
HOLD SPECIMEN: NORMAL
IMM GRANULOCYTES # BLD AUTO: 0.03 10*3/MM3 (ref 0–0.05)
IMM GRANULOCYTES NFR BLD AUTO: 0.4 % (ref 0–0.5)
LYMPHOCYTES # BLD AUTO: 0.61 10*3/MM3 (ref 0.7–3.1)
LYMPHOCYTES NFR BLD AUTO: 8.6 % (ref 19.6–45.3)
MCH RBC QN AUTO: 32.2 PG (ref 26.6–33)
MCHC RBC AUTO-ENTMCNC: 33.2 G/DL (ref 31.5–35.7)
MCV RBC AUTO: 96.9 FL (ref 79–97)
MONOCYTES # BLD AUTO: 1.34 10*3/MM3 (ref 0.1–0.9)
MONOCYTES NFR BLD AUTO: 18.8 % (ref 5–12)
NEUTROPHILS NFR BLD AUTO: 5.03 10*3/MM3 (ref 1.7–7)
NEUTROPHILS NFR BLD AUTO: 70.8 % (ref 42.7–76)
NRBC BLD AUTO-RTO: 0 /100 WBC (ref 0–0.2)
NT-PROBNP SERPL-MCNC: 27.5 PG/ML (ref 0–450)
PLATELET # BLD AUTO: 103 10*3/MM3 (ref 140–450)
PMV BLD AUTO: 10 FL (ref 6–12)
POTASSIUM SERPL-SCNC: 3.6 MMOL/L (ref 3.5–5.2)
PROT SERPL-MCNC: 7.3 G/DL (ref 6–8.5)
QT INTERVAL: 353 MS
RBC # BLD AUTO: 3.91 10*6/MM3 (ref 4.14–5.8)
SODIUM SERPL-SCNC: 139 MMOL/L (ref 136–145)
TROPONIN T SERPL-MCNC: <0.01 NG/ML (ref 0–0.03)
WBC NRBC COR # BLD: 7.11 10*3/MM3 (ref 3.4–10.8)
WHOLE BLOOD HOLD SPECIMEN: NORMAL
WHOLE BLOOD HOLD SPECIMEN: NORMAL

## 2022-02-23 PROCEDURE — 36415 COLL VENOUS BLD VENIPUNCTURE: CPT

## 2022-02-23 PROCEDURE — 93005 ELECTROCARDIOGRAM TRACING: CPT

## 2022-02-23 PROCEDURE — 84484 ASSAY OF TROPONIN QUANT: CPT

## 2022-02-23 PROCEDURE — 71045 X-RAY EXAM CHEST 1 VIEW: CPT

## 2022-02-23 PROCEDURE — 85025 COMPLETE CBC W/AUTO DIFF WBC: CPT

## 2022-02-23 PROCEDURE — 99283 EMERGENCY DEPT VISIT LOW MDM: CPT

## 2022-02-23 PROCEDURE — 80053 COMPREHEN METABOLIC PANEL: CPT

## 2022-02-23 PROCEDURE — 83880 ASSAY OF NATRIURETIC PEPTIDE: CPT

## 2022-02-23 PROCEDURE — 93010 ELECTROCARDIOGRAM REPORT: CPT | Performed by: SPECIALIST

## 2022-02-23 RX ORDER — SODIUM CHLORIDE 0.9 % (FLUSH) 0.9 %
10 SYRINGE (ML) INJECTION AS NEEDED
Status: DISCONTINUED | OUTPATIENT
Start: 2022-02-23 | End: 2022-02-23 | Stop reason: HOSPADM

## 2022-02-23 NOTE — ED PROVIDER NOTES
Subjective   Ra Estrada is a 46 y.o. male with a hx of hepatitis C and cirrhosis who presents to the emergency department today with complaints of shortness of breath over the past week. Pt was seen in another ED for sx a week ago where he was discharged home after treatment. Due to persistent sx, he later went to urgent care where an x-ray was preformed. There he was informed of some fluid on his lungs and referred to a Pulmonologist. He was supposedly told if he couldn't get in with the Pulmonologist to come to this ED. In addition to the shortness of breath he complains of severe left flank pain. He denies abdominal pain, cough, fever, nausea, emesis, diarrhea, chest pain, chills, diaphoresis, or other pertinent sx.       History provided by:  Patient   used: No        Review of Systems   Constitutional: Negative for chills and fever.   HENT: Negative for congestion, rhinorrhea and sore throat.    Eyes: Negative for pain and visual disturbance.   Respiratory: Positive for shortness of breath. Negative for apnea, cough and chest tightness.    Cardiovascular: Negative for chest pain and palpitations.   Gastrointestinal: Negative for abdominal pain, diarrhea, nausea and vomiting.   Genitourinary: Positive for flank pain. Negative for difficulty urinating and dysuria.   Musculoskeletal: Negative for joint swelling and myalgias.   Skin: Negative for color change.   Neurological: Negative for seizures and headaches.   Psychiatric/Behavioral: Negative.    All other systems reviewed and are negative.      Past Medical History:   Diagnosis Date   • Anxiety    • Breast asymmetry 08/2019    Right lump   • Cirrhosis (HCC)    • Esophageal varix (HCC)     Multiple bandings, Dr Giraldo [facility] follows, has bandings every 2 months   • GERD (gastroesophageal reflux disease)    • History of cirrhosis     Stage III/Dr. Giraldo [facility]   • History of GI bleed    • History of kidney stones    • History of transfusion     • Hx of ascites    • Hypotestosteronism    • Infectious viral hepatitis     2010   • Jaundice    • Substance abuse (HCC)     H/O drug use, sees substance abuse doctor   • Thrombocytopenia (HCC)     sees Dr Jay, last 9/2020   • Umbilical hernia     sched repair       No Known Allergies    Past Surgical History:   Procedure Laterality Date   • APPENDECTOMY     • ENDOSCOPY     • ENDOSCOPY W/ BANDING      multiple, last 9/25/20   • ESOPHAGUS SURGERY      Banding more than 10 times   • ORIF FEMUR FRACTURE Right 10/2019   • PITUITARY SURGERY      drained   • SHOULDER ARTHROSCOPY Right 1995    Rotator cuff   • UMBILICAL HERNIA REPAIR N/A 8/13/2020    Procedure: UMBILICAL HERNIA REPAIR;  Surgeon: Sylvia Guzman MD;  Location:  [facility];  Service: General;  Laterality: N/A;   • UMBILICAL HERNIA REPAIR N/A 10/15/2020    Procedure: UMBILICAL HERNIA REPAIR WITH MESH;  Surgeon: Sylvia Guzman MD;  Location:  [facility];  Service: General;  Laterality: N/A;       Family History   Problem Relation Age of Onset   • Coronary artery disease Father    • Malig Hyperthermia Neg Hx        Social History     Socioeconomic History   • Marital status:    • Years of education: College   Tobacco Use   • Smoking status: Current Every Day Smoker     Packs/day: 1.00     Years: 25.00     Pack years: 25.00     Types: Cigarettes   • Smokeless tobacco: Never Used   Vaping Use   • Vaping Use: Never used   Substance and Sexual Activity   • Alcohol use: Not Currently     Comment: last 7/2020   • Drug use: Yes     Frequency: 3.0 times per week     Types: Marijuana     Comment: methadone hx  no use sine 2016-  marijuana use 10/14/20   • Sexual activity: Defer         Objective   Physical Exam  Vitals and nursing note reviewed.   Constitutional:       General: He is not in acute distress.     Appearance: Normal appearance. He is not toxic-appearing.   HENT:      Head: Normocephalic and atraumatic.      Jaw: There is normal jaw  occlusion.   Eyes:      General: Lids are normal.      Extraocular Movements: Extraocular movements intact.      Conjunctiva/sclera: Conjunctivae normal.      Pupils: Pupils are equal, round, and reactive to light.   Cardiovascular:      Rate and Rhythm: Normal rate and regular rhythm.      Pulses: Normal pulses.      Heart sounds: Normal heart sounds.   Pulmonary:      Effort: Pulmonary effort is normal. No respiratory distress.      Breath sounds: Decreased breath sounds (at bases) present. No wheezing or rhonchi.   Abdominal:      General: Abdomen is flat and protuberant.      Palpations: Abdomen is soft.      Tenderness: There is no abdominal tenderness. There is no guarding or rebound.   Musculoskeletal:         General: Normal range of motion.      Cervical back: Normal range of motion and neck supple.      Right lower leg: No edema.      Left lower leg: No edema.      Comments: Area of ecchymosis to left flank and left lower back.   Skin:     General: Skin is warm and dry.   Neurological:      Mental Status: He is alert and oriented to person, place, and time. Mental status is at baseline.   Psychiatric:         Mood and Affect: Mood normal.         Procedures         ED Course  ED Course as of 02/24/22 1112   Wed Feb 23, 2022   1331 Refused CT chest. Pt would rather follow up with Pulmonology  [MW]      ED Course User Index  [MW] Bhumi Salazar     Labs Reviewed   COMPREHENSIVE METABOLIC PANEL - Abnormal; Notable for the following components:       Result Value    Glucose 105 (*)     Creatinine 0.70 (*)     ALT (SGPT) 61 (*)     AST (SGOT) 50 (*)     Alkaline Phosphatase 157 (*)     All other components within normal limits    Narrative:     GFR Normal >60  Chronic Kidney Disease <60  Kidney Failure <15     CBC WITH AUTO DIFFERENTIAL - Abnormal; Notable for the following components:    RBC 3.91 (*)     Hemoglobin 12.6 (*)     RDW 16.6 (*)     RDW-SD 59.0 (*)     Platelets 103 (*)     Lymphocyte % 8.6 (*)      Monocyte % 18.8 (*)     Lymphocytes, Absolute 0.61 (*)     Monocytes, Absolute 1.34 (*)     All other components within normal limits   BNP (IN-HOUSE) - Normal    Narrative:     Among patients with dyspnea, NT-proBNP is highly sensitive for the detection of acute congestive heart failure. In addition NT-proBNP of <300 pg/ml effectively rules out acute congestive heart failure with 99% negative predictive value.    Results may be falsely decreased if patient taking Biotin.     TROPONIN (IN-HOUSE) - Normal    Narrative:     Troponin T Reference Range:  <= 0.03 ng/mL-   Negative for AMI  >0.03 ng/mL-     Abnormal for myocardial necrosis.  Clinicians would have to utilize clinical acumen, EKG, Troponin and serial changes to determine if it is an Acute Myocardial Infarction or myocardial injury due to an underlying chronic condition.       Results may be falsely decreased if patient taking Biotin.     RAINBOW DRAW    Narrative:     The following orders were created for panel order [place] Draw.  Procedure                               Abnormality         Status                     ---------                               -----------         ------                     Green Top (Gel)[[id number]]                                  Final result               Lavender Top[[id number]]                                     Final result               Gold Top - SST[[id number]]                                   Final result               Light Blue Top[[id number]]                                   Final result                 Please view results for these tests on the individual orders.   CBC AND DIFFERENTIAL    Narrative:     The following orders were created for panel order CBC & Differential.  Procedure                               Abnormality         Status                     ---------                               -----------         ------                     CBC Auto Differential[[id number]]        Abnormal            Final  result               Scan Slide[[id number]]                                                                    Please view results for these tests on the individual orders.   GREEN TOP   LAVENDER TOP   GOLD TOP - SST   LIGHT BLUE TOP     XR Chest 1 View    Result Date: 2/23/2022  PROCEDURE: XR CHEST 1 VW  COMPARISON: None  INDICATIONS: SHORTNESS OF BREATH SINCE YESTERDAY  FINDINGS:  Incomplete inspiration.  Heart size and pulmonary vasculature appear normal.  Opacity in the left costophrenic angle, airspace consolidation versus costophrenic blunting.  Bandlike opacities in both lung bases.  Sharp right costophrenic angle.  Suture anchors in the right glenoid         1. Airspace disease in the peripheral left base versus left pleural effusion.  Recommend PA and lateral views for clarification  2. Bibasilar atelectasis        SURI CORTES MD       Electronically Signed and Approved By: SURI CORTES MD on 2/23/2022 at 13:17                                                    MDM  Number of Diagnoses or Management Options  Other ascites  Pleural effusion on left  Diagnosis management comments: I informed the patient that we could do a CT of his chest to determine the extent of the pleural effusion however at this point the patient wanted to be discharged home.  The patient did oriented for the test.  Patient received a CT of the abdomen which revealed ascites and no other acute findings.  The patient will be discharged home and he is to follow-up with his doctor as directed.       Amount and/or Complexity of Data Reviewed  Clinical lab tests: reviewed  Tests in the radiology section of CPT®: reviewed  Tests in the medicine section of CPT®: reviewed  Decide to obtain previous medical records or to obtain history from someone other than the patient: yes  Review and summarize past medical records: yes  Independent visualization of images, tracings, or specimens: yes        Final diagnoses:   Other ascites   Pleural  effusion on left       Documentation assistance provided by wanda Salazar.  Information recorded by the scribe was done at my direction and has been verified and validated by me.     Bhumi Salazar  02/23/22 1328       Bhumi Salazar  02/23/22 1329       Nitin Mason,   02/24/22 1112

## 2022-02-23 NOTE — DISCHARGE INSTRUCTIONS
Take medications as directed.  Turn for worsening symptoms.  Follow-up with Dr. Wilson by calling today for an appointment.

## 2022-03-08 ENCOUNTER — OFFICE VISIT (OUTPATIENT)
Dept: SURGERY | Facility: CLINIC | Age: 47
End: 2022-03-08

## 2022-03-08 VITALS
DIASTOLIC BLOOD PRESSURE: 80 MMHG | SYSTOLIC BLOOD PRESSURE: 120 MMHG | BODY MASS INDEX: 26.99 KG/M2 | HEART RATE: 103 BPM | TEMPERATURE: 97.4 F | RESPIRATION RATE: 18 BRPM | OXYGEN SATURATION: 98 % | WEIGHT: 188.5 LBS | HEIGHT: 70 IN

## 2022-03-08 DIAGNOSIS — K46.9 RECURRENT HERNIA: Primary | ICD-10-CM

## 2022-03-08 PROBLEM — I85.00 ESOPHAGEAL VARICES (HCC): Status: ACTIVE | Noted: 2019-08-25

## 2022-03-08 PROCEDURE — 99213 OFFICE O/P EST LOW 20 MIN: CPT | Performed by: SURGERY

## 2022-03-08 RX ORDER — FUROSEMIDE 80 MG
80 TABLET ORAL EVERY MORNING
COMMUNITY
Start: 2022-02-23

## 2022-03-08 RX ORDER — SODIUM CHLORIDE 9 MG/ML
100 INJECTION, SOLUTION INTRAVENOUS CONTINUOUS
Status: CANCELLED | OUTPATIENT
Start: 2022-03-08

## 2022-03-08 NOTE — H&P (VIEW-ONLY)
Chief Complaint   Patient presents with   • Abdominal wall seroma, subsequent encounter        Patient is a 46 y.o. male report as he has fluid building back up under his umbilicus.  Patient has had 2 umbilical hernias repaired in the past.  He also has a history of ascites and a seroma that had to be drained multiple times.  He is thinking it may need to be drained again.  He reports the more he is up and working the bigger it gets.  When the patient lies down, it goes away.  Patient has not had fever, chills, nausea or vomiting.  Patient does not have any pain associated with the area.    Past Medical History:   Diagnosis Date   • Anxiety    • Breast asymmetry 08/2019    Right lump   • Cirrhosis (HCC)    • Esophageal varix (HCC)     Multiple bandings, Dr Giraldo [facility] follows, has bandings every 2 months   • GERD (gastroesophageal reflux disease)    • History of cirrhosis     Stage III/Dr. Giraldo [facility]   • History of GI bleed    • History of kidney stones    • History of transfusion    • Hx of ascites    • Hypotestosteronism    • Infectious viral hepatitis     2010   • Jaundice    • Substance abuse (HCC)     H/O drug use, sees substance abuse doctor   • Thrombocytopenia (HCC)     sees Dr Jay, last 9/2020   • Umbilical hernia     sched repair     Past Surgical History:   Procedure Laterality Date   • APPENDECTOMY     • ENDOSCOPY     • ENDOSCOPY W/ BANDING      multiple, last 9/25/20   • ESOPHAGUS SURGERY      Banding more than 10 times   • ORIF FEMUR FRACTURE Right 10/2019   • PITUITARY SURGERY      drained   • SHOULDER ARTHROSCOPY Right 1995    Rotator cuff   • UMBILICAL HERNIA REPAIR N/A 8/13/2020    Procedure: UMBILICAL HERNIA REPAIR;  Surgeon: Sylvia Guzman MD;  Location:  [facility];  Service: General;  Laterality: N/A;   • UMBILICAL HERNIA REPAIR N/A 10/15/2020    Procedure: UMBILICAL HERNIA REPAIR WITH MESH;  Surgeon: Sylvia Guzman MD;  Location:  [facility];  Service: General;  Laterality: N/A;      Family History   Problem Relation Age of Onset   • Coronary artery disease Father    • Malig Hyperthermia Neg Hx      Social History     Tobacco Use   • Smoking status: Current Every Day Smoker     Packs/day: 1.00     Years: 25.00     Pack years: 25.00     Types: Cigarettes   • Smokeless tobacco: Never Used   Vaping Use   • Vaping Use: Never used   Substance Use Topics   • Alcohol use: Not Currently     Comment: last 7/2020   • Drug use: Yes     Frequency: 3.0 times per week     Types: Marijuana     Comment: methadone hx  no use sine 2016-  marijuana use 10/14/20     No Known Allergies    Current Outpatient Medications:   •  furosemide (LASIX) 80 MG tablet, Take 80 mg by mouth Every Morning., Disp: , Rfl:   •  MAGNESIUM PO, Take 1 tablet by mouth Daily., Disp: , Rfl:   •  omeprazole (priLOSEC) 20 MG capsule, Take 20 mg by mouth Daily., Disp: , Rfl:   •  ondansetron (ZOFRAN) 4 MG tablet, TAKE 1 TABLET BY MOUTH EVERY 4 HOURS AS NEEDED, Disp: 30 tablet, Rfl: 1  •  OYSCO 500 + D 500-200 MG-UNIT tablet, TAKE 1 TABLET BY MOUTH 1 TIME EACH DAY, Disp: , Rfl:   •  potassium chloride 10 MEQ CR tablet, Take 10 mEq by mouth Daily., Disp: , Rfl:   •  POTASSIUM PO, Take  by mouth As Needed (cramps). OTC, Disp: , Rfl:   •  spironolactone (ALDACTONE) 100 MG tablet, Take 100 mg by mouth Daily., Disp: , Rfl:   •  vitamin B-12 (CYANOCOBALAMIN) 1000 MCG tablet, Take  by mouth Daily., Disp: , Rfl:   •  Ferrous Sulfate (IRON PO), Take 1 tablet by mouth Daily., Disp: , Rfl:   •  fluticasone (FLONASE) 50 MCG/ACT nasal spray, SHAKE LQ AND U 2 SPRAYS IEN D, Disp: , Rfl:   •  rOPINIRole (REQUIP) 0.25 MG tablet, TK 1 T PO HS, Disp: , Rfl:     Review of Systems  General: Patient reports during good health  Eyes: No eye problems  Ears, nose, mouth and throat: No rhinitis, no hearing problems, no chronic cough  Cardiovascular/heart: Denies palpitations, syncope or chest pain  Respiratory/lung: Denies shortness of breath, hemoptysis, dyspnea  "on exertion   Genital/urinary: No frequency, hematuria or dysuria  Hematological/lymphatic: Denies anemia or other problems  Musculoskeletal: No joint pain, no defects  Skin: No psoriasis or other skin issues  Neurological: No seizures or other neurological problems  Psychiatric: None  Endocrine: Negative  Gastro-intestinal: No constipation, no diarrhea, no melena, no hematochezia  Vitals:    03/08/22 0915   BP: 120/80   BP Location: Left arm   Patient Position: Sitting   Cuff Size: Adult   Pulse: 103   Resp: 18   Temp: 97.4 °F (36.3 °C)   TempSrc: Temporal   SpO2: 98%   Weight: 85.5 kg (188 lb 8 oz)   Height: 177.8 cm (70\")       Physical Exam  General/psychological:   Alert and oriented x3, in no acute distress  HEENT: Normocephalic, atraumatic, PERRLA, EOMI, sclerae anicteric, moist mucous membranes, neck is supple, no JVD, no carotid bruits, no thyromegaly no adenopathy  Respiratory: CTA and percussion  CVA: RRR, normal S1-S2, no murmurs, no gallops or rubs  GI: Positive BS, soft, nondistended, nontender, no rebound, no guarding, recurrent abdominal wall hernia , no organomegaly and no masses  Musculoskeletal: Moves all 4 ext, no clubbing, no cyanosis or edema  Neurovascular: Grossly intact  Debilities: None  Emotional behavior: Appropriate     Assessment:  Recurrent Incisional hernia     Plan:  Patient has a recurrent hernia with ascites.  Patient will need an open recurrent hernia repair with Stratice biological mesh.  I have discussed this procedure in detail with patient.  I have discussed the risks, benefits and alternatives.  I have discussed the risk of anesthesia, bleeding, infection, DVT, pneumonia, bowel injuries and recurrence.  Patient understands these risks, benefits and alternatives and wishes to proceed.    Sylvia Guzman MD  General, Minimally Invasive and Endoscopic Surgery  [facility]      [address] [facility] [address] [place] [address] [address]    [address]  [place], [place] " [address]               [place] [address]    P: [phone]  F: [phone]    Cc:  Casa Owen MD

## 2022-03-08 NOTE — PROGRESS NOTES
Chief Complaint   Patient presents with   • Abdominal wall seroma, subsequent encounter        Patient is a 46 y.o. male report as he has fluid building back up under his umbilicus.  Patient has had 2 umbilical hernias repaired in the past.  He also has a history of ascites and a seroma that had to be drained multiple times.  He is thinking it may need to be drained again.  He reports the more he is up and working the bigger it gets.  When the patient lies down, it goes away.  Patient has not had fever, chills, nausea or vomiting.  Patient does not have any pain associated with the area.    Past Medical History:   Diagnosis Date   • Anxiety    • Breast asymmetry 08/2019    Right lump   • Cirrhosis (HCC)    • Esophageal varix (HCC)     Multiple bandings, Dr Giraldo [facility] follows, has bandings every 2 months   • GERD (gastroesophageal reflux disease)    • History of cirrhosis     Stage III/Dr. Giraldo [facility]   • History of GI bleed    • History of kidney stones    • History of transfusion    • Hx of ascites    • Hypotestosteronism    • Infectious viral hepatitis     2010   • Jaundice    • Substance abuse (HCC)     H/O drug use, sees substance abuse doctor   • Thrombocytopenia (HCC)     sees Dr Jay, last 9/2020   • Umbilical hernia     sched repair     Past Surgical History:   Procedure Laterality Date   • APPENDECTOMY     • ENDOSCOPY     • ENDOSCOPY W/ BANDING      multiple, last 9/25/20   • ESOPHAGUS SURGERY      Banding more than 10 times   • ORIF FEMUR FRACTURE Right 10/2019   • PITUITARY SURGERY      drained   • SHOULDER ARTHROSCOPY Right 1995    Rotator cuff   • UMBILICAL HERNIA REPAIR N/A 8/13/2020    Procedure: UMBILICAL HERNIA REPAIR;  Surgeon: Sylvia Guzman MD;  Location:  [facility];  Service: General;  Laterality: N/A;   • UMBILICAL HERNIA REPAIR N/A 10/15/2020    Procedure: UMBILICAL HERNIA REPAIR WITH MESH;  Surgeon: Sylvia Guzman MD;  Location:  [facility];  Service: General;  Laterality: N/A;      Family History   Problem Relation Age of Onset   • Coronary artery disease Father    • Malig Hyperthermia Neg Hx      Social History     Tobacco Use   • Smoking status: Current Every Day Smoker     Packs/day: 1.00     Years: 25.00     Pack years: 25.00     Types: Cigarettes   • Smokeless tobacco: Never Used   Vaping Use   • Vaping Use: Never used   Substance Use Topics   • Alcohol use: Not Currently     Comment: last 7/2020   • Drug use: Yes     Frequency: 3.0 times per week     Types: Marijuana     Comment: methadone hx  no use sine 2016-  marijuana use 10/14/20     No Known Allergies    Current Outpatient Medications:   •  furosemide (LASIX) 80 MG tablet, Take 80 mg by mouth Every Morning., Disp: , Rfl:   •  MAGNESIUM PO, Take 1 tablet by mouth Daily., Disp: , Rfl:   •  omeprazole (priLOSEC) 20 MG capsule, Take 20 mg by mouth Daily., Disp: , Rfl:   •  ondansetron (ZOFRAN) 4 MG tablet, TAKE 1 TABLET BY MOUTH EVERY 4 HOURS AS NEEDED, Disp: 30 tablet, Rfl: 1  •  OYSCO 500 + D 500-200 MG-UNIT tablet, TAKE 1 TABLET BY MOUTH 1 TIME EACH DAY, Disp: , Rfl:   •  potassium chloride 10 MEQ CR tablet, Take 10 mEq by mouth Daily., Disp: , Rfl:   •  POTASSIUM PO, Take  by mouth As Needed (cramps). OTC, Disp: , Rfl:   •  spironolactone (ALDACTONE) 100 MG tablet, Take 100 mg by mouth Daily., Disp: , Rfl:   •  vitamin B-12 (CYANOCOBALAMIN) 1000 MCG tablet, Take  by mouth Daily., Disp: , Rfl:   •  Ferrous Sulfate (IRON PO), Take 1 tablet by mouth Daily., Disp: , Rfl:   •  fluticasone (FLONASE) 50 MCG/ACT nasal spray, SHAKE LQ AND U 2 SPRAYS IEN D, Disp: , Rfl:   •  rOPINIRole (REQUIP) 0.25 MG tablet, TK 1 T PO HS, Disp: , Rfl:     Review of Systems  General: Patient reports during good health  Eyes: No eye problems  Ears, nose, mouth and throat: No rhinitis, no hearing problems, no chronic cough  Cardiovascular/heart: Denies palpitations, syncope or chest pain  Respiratory/lung: Denies shortness of breath, hemoptysis, dyspnea  "on exertion   Genital/urinary: No frequency, hematuria or dysuria  Hematological/lymphatic: Denies anemia or other problems  Musculoskeletal: No joint pain, no defects  Skin: No psoriasis or other skin issues  Neurological: No seizures or other neurological problems  Psychiatric: None  Endocrine: Negative  Gastro-intestinal: No constipation, no diarrhea, no melena, no hematochezia  Vitals:    03/08/22 0915   BP: 120/80   BP Location: Left arm   Patient Position: Sitting   Cuff Size: Adult   Pulse: 103   Resp: 18   Temp: 97.4 °F (36.3 °C)   TempSrc: Temporal   SpO2: 98%   Weight: 85.5 kg (188 lb 8 oz)   Height: 177.8 cm (70\")       Physical Exam  General/psychological:   Alert and oriented x3, in no acute distress  HEENT: Normocephalic, atraumatic, PERRLA, EOMI, sclerae anicteric, moist mucous membranes, neck is supple, no JVD, no carotid bruits, no thyromegaly no adenopathy  Respiratory: CTA and percussion  CVA: RRR, normal S1-S2, no murmurs, no gallops or rubs  GI: Positive BS, soft, nondistended, nontender, no rebound, no guarding, recurrent abdominal wall hernia , no organomegaly and no masses  Musculoskeletal: Moves all 4 ext, no clubbing, no cyanosis or edema  Neurovascular: Grossly intact  Debilities: None  Emotional behavior: Appropriate     Assessment:  Recurrent Incisional hernia     Plan:  Patient has a recurrent hernia with ascites.  Patient will need an open recurrent hernia repair with Stratice biological mesh.  I have discussed this procedure in detail with patient.  I have discussed the risks, benefits and alternatives.  I have discussed the risk of anesthesia, bleeding, infection, DVT, pneumonia, bowel injuries and recurrence.  Patient understands these risks, benefits and alternatives and wishes to proceed.    Sylvia Guzman MD  General, Minimally Invasive and Endoscopic Surgery  [facility]      [address] [facility] [address] [place] [address] [address]    [address]  [place], [place] " [address]               [place] [address]    P: [phone]  F: [phone]    Cc:  Casa Owen MD

## 2022-03-11 ENCOUNTER — TELEPHONE (OUTPATIENT)
Dept: SURGERY | Facility: CLINIC | Age: 47
End: 2022-03-11

## 2022-03-11 NOTE — TELEPHONE ENCOUNTER
CALLED PATIENT AND REQUESTED HE CALL BACK IN REFERENCE TO HIS PAT.  HE DID NOT SHOW WHEN SCHEDULED

## 2022-03-15 ENCOUNTER — ANESTHESIA EVENT (OUTPATIENT)
Dept: PERIOP | Facility: HOSPITAL | Age: 47
End: 2022-03-15

## 2022-03-17 ENCOUNTER — ANESTHESIA (OUTPATIENT)
Dept: PERIOP | Facility: HOSPITAL | Age: 47
End: 2022-03-17

## 2022-03-17 ENCOUNTER — HOSPITAL ENCOUNTER (OUTPATIENT)
Facility: HOSPITAL | Age: 47
Setting detail: HOSPITAL OUTPATIENT SURGERY
Discharge: HOME | End: 2022-03-17
Attending: SURGERY | Admitting: SURGERY

## 2022-03-17 VITALS
WEIGHT: 194.8 LBS | RESPIRATION RATE: 14 BRPM | BODY MASS INDEX: 27.95 KG/M2 | HEART RATE: 105 BPM | DIASTOLIC BLOOD PRESSURE: 78 MMHG | OXYGEN SATURATION: 93 % | TEMPERATURE: 98.2 F | SYSTOLIC BLOOD PRESSURE: 120 MMHG

## 2022-03-17 DIAGNOSIS — K46.9 RECURRENT HERNIA: ICD-10-CM

## 2022-03-17 LAB
DEPRECATED RDW RBC AUTO: 58.2 FL (ref 37–54)
ERYTHROCYTE [DISTWIDTH] IN BLOOD BY AUTOMATED COUNT: 15.9 % (ref 12.3–15.4)
HCT VFR BLD AUTO: 34.6 % (ref 37.5–51)
HGB BLD-MCNC: 10.8 G/DL (ref 13–17.7)
MCH RBC QN AUTO: 30.8 PG (ref 26.6–33)
MCHC RBC AUTO-ENTMCNC: 31.2 G/DL (ref 31.5–35.7)
MCV RBC AUTO: 98.6 FL (ref 79–97)
PLATELET # BLD AUTO: 93 10*3/MM3 (ref 140–450)
PMV BLD AUTO: 9.7 FL (ref 6–12)
RBC # BLD AUTO: 3.51 10*6/MM3 (ref 4.14–5.8)
WBC NRBC COR # BLD: 4.49 10*3/MM3 (ref 3.4–10.8)

## 2022-03-17 PROCEDURE — 87635 SARS-COV-2 COVID-19 AMP PRB: CPT | Performed by: SURGERY

## 2022-03-17 PROCEDURE — 25010000002 FENTANYL CITRATE (PF) 50 MCG/ML SOLUTION: Performed by: NURSE ANESTHETIST, CERTIFIED REGISTERED

## 2022-03-17 PROCEDURE — 49568 PR IMPLANT MESH HERNIA REPAIR/DEBRIDEMENT CLOSURE: CPT | Performed by: SURGERY

## 2022-03-17 PROCEDURE — 49568 PR IMPLANT MESH HERNIA REPAIR/DEBRIDEMENT CLOSURE: CPT | Performed by: SPECIALIST/TECHNOLOGIST, OTHER

## 2022-03-17 PROCEDURE — 85027 COMPLETE CBC AUTOMATED: CPT | Performed by: ANESTHESIOLOGY

## 2022-03-17 PROCEDURE — 25010000002 ONDANSETRON PER 1 MG: Performed by: ANESTHESIOLOGY

## 2022-03-17 PROCEDURE — 25010000002 HYDROMORPHONE 1 MG/ML SOLUTION: Performed by: NURSE ANESTHETIST, CERTIFIED REGISTERED

## 2022-03-17 PROCEDURE — C9803 HOPD COVID-19 SPEC COLLECT: HCPCS

## 2022-03-17 PROCEDURE — 25010000002 SUCCINYLCHOLINE PER 20 MG: Performed by: NURSE ANESTHETIST, CERTIFIED REGISTERED

## 2022-03-17 PROCEDURE — 0 CEFAZOLIN SODIUM-DEXTROSE 2-3 GM-%(50ML) RECONSTITUTED SOLUTION: Performed by: SURGERY

## 2022-03-17 PROCEDURE — 49565 PR REPAIR RECURR INCIS HERNIA,REDUC: CPT | Performed by: SPECIALIST/TECHNOLOGIST, OTHER

## 2022-03-17 PROCEDURE — 25010000002 PROPOFOL 10 MG/ML EMULSION: Performed by: NURSE ANESTHETIST, CERTIFIED REGISTERED

## 2022-03-17 PROCEDURE — 25010000002 MIDAZOLAM PER 1MG: Performed by: ANESTHESIOLOGY

## 2022-03-17 PROCEDURE — 49565 PR REPAIR RECURR INCIS HERNIA,REDUC: CPT | Performed by: SURGERY

## 2022-03-17 DEVICE — IMPLANTABLE DEVICE: Type: IMPLANTABLE DEVICE | Site: ABDOMEN | Status: FUNCTIONAL

## 2022-03-17 RX ORDER — ONDANSETRON 2 MG/ML
4 INJECTION INTRAMUSCULAR; INTRAVENOUS ONCE AS NEEDED
Status: COMPLETED | OUTPATIENT
Start: 2022-03-17 | End: 2022-03-17

## 2022-03-17 RX ORDER — ONDANSETRON 4 MG/1
4 TABLET, FILM COATED ORAL EVERY 4 HOURS PRN
Qty: 15 TABLET | Refills: 1 | Status: SHIPPED | OUTPATIENT
Start: 2022-03-17 | End: 2022-04-01

## 2022-03-17 RX ORDER — FENTANYL CITRATE 50 UG/ML
INJECTION, SOLUTION INTRAMUSCULAR; INTRAVENOUS AS NEEDED
Status: DISCONTINUED | OUTPATIENT
Start: 2022-03-17 | End: 2022-03-17 | Stop reason: SURG

## 2022-03-17 RX ORDER — BUPIVACAINE HYDROCHLORIDE 2.5 MG/ML
INJECTION, SOLUTION EPIDURAL; INFILTRATION; INTRACAUDAL AS NEEDED
Status: DISCONTINUED | OUTPATIENT
Start: 2022-03-17 | End: 2022-03-17 | Stop reason: HOSPADM

## 2022-03-17 RX ORDER — SODIUM CHLORIDE 0.9 % (FLUSH) 0.9 %
10 SYRINGE (ML) INJECTION AS NEEDED
Status: DISCONTINUED | OUTPATIENT
Start: 2022-03-17 | End: 2022-03-17 | Stop reason: HOSPADM

## 2022-03-17 RX ORDER — CEFAZOLIN SODIUM 2 G/50ML
2 SOLUTION INTRAVENOUS ONCE
Status: COMPLETED | OUTPATIENT
Start: 2022-03-17 | End: 2022-03-17

## 2022-03-17 RX ORDER — SODIUM CHLORIDE 9 MG/ML
40 INJECTION, SOLUTION INTRAVENOUS AS NEEDED
Status: DISCONTINUED | OUTPATIENT
Start: 2022-03-17 | End: 2022-03-17 | Stop reason: HOSPADM

## 2022-03-17 RX ORDER — SODIUM CHLORIDE 9 MG/ML
100 INJECTION, SOLUTION INTRAVENOUS CONTINUOUS
Status: DISCONTINUED | OUTPATIENT
Start: 2022-03-17 | End: 2022-03-17 | Stop reason: HOSPADM

## 2022-03-17 RX ORDER — DIPHENHYDRAMINE HYDROCHLORIDE 50 MG/ML
12.5 INJECTION INTRAMUSCULAR; INTRAVENOUS
Status: DISCONTINUED | OUTPATIENT
Start: 2022-03-17 | End: 2022-03-17 | Stop reason: HOSPADM

## 2022-03-17 RX ORDER — SODIUM CHLORIDE 0.9 % (FLUSH) 0.9 %
10 SYRINGE (ML) INJECTION EVERY 12 HOURS SCHEDULED
Status: DISCONTINUED | OUTPATIENT
Start: 2022-03-17 | End: 2022-03-17 | Stop reason: HOSPADM

## 2022-03-17 RX ORDER — PROPOFOL 10 MG/ML
VIAL (ML) INTRAVENOUS AS NEEDED
Status: DISCONTINUED | OUTPATIENT
Start: 2022-03-17 | End: 2022-03-17 | Stop reason: SURG

## 2022-03-17 RX ORDER — KETAMINE HYDROCHLORIDE 10 MG/ML
INJECTION INTRAMUSCULAR; INTRAVENOUS AS NEEDED
Status: DISCONTINUED | OUTPATIENT
Start: 2022-03-17 | End: 2022-03-17 | Stop reason: SURG

## 2022-03-17 RX ORDER — MAGNESIUM HYDROXIDE 1200 MG/15ML
LIQUID ORAL AS NEEDED
Status: DISCONTINUED | OUTPATIENT
Start: 2022-03-17 | End: 2022-03-17 | Stop reason: HOSPADM

## 2022-03-17 RX ORDER — ONDANSETRON 2 MG/ML
4 INJECTION INTRAMUSCULAR; INTRAVENOUS ONCE AS NEEDED
Status: DISCONTINUED | OUTPATIENT
Start: 2022-03-17 | End: 2022-03-17 | Stop reason: HOSPADM

## 2022-03-17 RX ORDER — OXYCODONE HYDROCHLORIDE 5 MG/1
5 TABLET ORAL EVERY 4 HOURS PRN
Qty: 30 TABLET | Refills: 0 | Status: SHIPPED | OUTPATIENT
Start: 2022-03-17

## 2022-03-17 RX ORDER — LIDOCAINE HYDROCHLORIDE 10 MG/ML
0.5 INJECTION, SOLUTION EPIDURAL; INFILTRATION; INTRACAUDAL; PERINEURAL ONCE AS NEEDED
Status: DISCONTINUED | OUTPATIENT
Start: 2022-03-17 | End: 2022-03-17 | Stop reason: HOSPADM

## 2022-03-17 RX ORDER — MIDAZOLAM HYDROCHLORIDE 2 MG/2ML
1 INJECTION, SOLUTION INTRAMUSCULAR; INTRAVENOUS
Status: COMPLETED | OUTPATIENT
Start: 2022-03-17 | End: 2022-03-17

## 2022-03-17 RX ORDER — FAMOTIDINE 10 MG/ML
20 INJECTION, SOLUTION INTRAVENOUS
Status: COMPLETED | OUTPATIENT
Start: 2022-03-17 | End: 2022-03-17

## 2022-03-17 RX ORDER — ROCURONIUM BROMIDE 10 MG/ML
INJECTION, SOLUTION INTRAVENOUS AS NEEDED
Status: DISCONTINUED | OUTPATIENT
Start: 2022-03-17 | End: 2022-03-17 | Stop reason: SURG

## 2022-03-17 RX ORDER — SUCCINYLCHOLINE CHLORIDE 20 MG/ML
INJECTION INTRAMUSCULAR; INTRAVENOUS AS NEEDED
Status: DISCONTINUED | OUTPATIENT
Start: 2022-03-17 | End: 2022-03-17 | Stop reason: SURG

## 2022-03-17 RX ORDER — SODIUM CHLORIDE, SODIUM LACTATE, POTASSIUM CHLORIDE, CALCIUM CHLORIDE 600; 310; 30; 20 MG/100ML; MG/100ML; MG/100ML; MG/100ML
9 INJECTION, SOLUTION INTRAVENOUS CONTINUOUS PRN
Status: DISCONTINUED | OUTPATIENT
Start: 2022-03-17 | End: 2022-03-17 | Stop reason: HOSPADM

## 2022-03-17 RX ORDER — OXYCODONE HYDROCHLORIDE AND ACETAMINOPHEN 5; 325 MG/1; MG/1
1 TABLET ORAL EVERY 4 HOURS PRN
Qty: 30 TABLET | Refills: 0 | Status: SHIPPED | OUTPATIENT
Start: 2022-03-17 | End: 2022-03-17 | Stop reason: CLARIF

## 2022-03-17 RX ORDER — SODIUM CHLORIDE, SODIUM LACTATE, POTASSIUM CHLORIDE, CALCIUM CHLORIDE 600; 310; 30; 20 MG/100ML; MG/100ML; MG/100ML; MG/100ML
100 INJECTION, SOLUTION INTRAVENOUS CONTINUOUS
Status: DISCONTINUED | OUTPATIENT
Start: 2022-03-17 | End: 2022-03-17 | Stop reason: HOSPADM

## 2022-03-17 RX ORDER — FENTANYL CITRATE 50 UG/ML
25 INJECTION, SOLUTION INTRAMUSCULAR; INTRAVENOUS
Status: DISCONTINUED | OUTPATIENT
Start: 2022-03-17 | End: 2022-03-17 | Stop reason: HOSPADM

## 2022-03-17 RX ORDER — LIDOCAINE HYDROCHLORIDE 20 MG/ML
INJECTION, SOLUTION INFILTRATION; PERINEURAL AS NEEDED
Status: DISCONTINUED | OUTPATIENT
Start: 2022-03-17 | End: 2022-03-17 | Stop reason: SURG

## 2022-03-17 RX ORDER — TRAMADOL HYDROCHLORIDE 50 MG/1
50 TABLET ORAL EVERY 6 HOURS PRN
Status: DISCONTINUED | OUTPATIENT
Start: 2022-03-17 | End: 2022-03-17 | Stop reason: HOSPADM

## 2022-03-17 RX ADMIN — KETAMINE HYDROCHLORIDE 20 MG: 10 INJECTION, SOLUTION INTRAMUSCULAR; INTRAVENOUS at 12:24

## 2022-03-17 RX ADMIN — FENTANYL CITRATE 50 MCG: 50 INJECTION INTRAMUSCULAR; INTRAVENOUS at 12:24

## 2022-03-17 RX ADMIN — MIDAZOLAM HYDROCHLORIDE 1 MG: 1 INJECTION, SOLUTION INTRAMUSCULAR; INTRAVENOUS at 11:54

## 2022-03-17 RX ADMIN — PROPOFOL 150 MG: 10 INJECTION, EMULSION INTRAVENOUS at 12:24

## 2022-03-17 RX ADMIN — MIDAZOLAM HYDROCHLORIDE 1 MG: 1 INJECTION, SOLUTION INTRAMUSCULAR; INTRAVENOUS at 12:15

## 2022-03-17 RX ADMIN — LIDOCAINE HYDROCHLORIDE 100 MG: 20 INJECTION, SOLUTION INFILTRATION; PERINEURAL at 12:24

## 2022-03-17 RX ADMIN — HYDROMORPHONE HYDROCHLORIDE 0.5 MG: 1 INJECTION, SOLUTION INTRAMUSCULAR; INTRAVENOUS; SUBCUTANEOUS at 14:13

## 2022-03-17 RX ADMIN — SODIUM CHLORIDE, POTASSIUM CHLORIDE, SODIUM LACTATE AND CALCIUM CHLORIDE 9 ML/HR: 600; 310; 30; 20 INJECTION, SOLUTION INTRAVENOUS at 10:09

## 2022-03-17 RX ADMIN — ROCURONIUM BROMIDE 35 MG: 10 INJECTION INTRAVENOUS at 12:45

## 2022-03-17 RX ADMIN — ONDANSETRON 4 MG: 2 INJECTION INTRAMUSCULAR; INTRAVENOUS at 10:51

## 2022-03-17 RX ADMIN — CEFAZOLIN SODIUM 2 G: 2 SOLUTION INTRAVENOUS at 12:30

## 2022-03-17 RX ADMIN — SUCCINYLCHOLINE CHLORIDE 120 MG: 20 INJECTION, SOLUTION INTRAMUSCULAR; INTRAVENOUS at 12:24

## 2022-03-17 RX ADMIN — FENTANYL CITRATE 50 MCG: 50 INJECTION INTRAMUSCULAR; INTRAVENOUS at 12:45

## 2022-03-17 RX ADMIN — FAMOTIDINE 20 MG: 10 INJECTION, SOLUTION INTRAVENOUS at 10:51

## 2022-03-17 RX ADMIN — ROCURONIUM BROMIDE 5 MG: 10 INJECTION INTRAVENOUS at 12:24

## 2022-03-17 NOTE — ANESTHESIA PROCEDURE NOTES
Airway  Urgency: elective    Date/Time: 3/17/2022 12:27 PM  Airway not difficult    General Information and Staff    Patient location during procedure: OR  CRNA: Beni Bauer CRNA    Indications and Patient Condition  Indications for airway management: airway protection    Preoxygenated: yes  Mask difficulty assessment: 0 - not attempted    Final Airway Details  Final airway type: endotracheal airway      Successful airway: ETT  Cuffed: yes   Successful intubation technique: direct laryngoscopy  Endotracheal tube insertion site: oral  Blade: Briceño  Blade size: 2  ETT size (mm): 7.5  Cormack-Lehane Classification: grade I - full view of glottis  Placement verified by: chest auscultation and capnometry   Measured from: lips  ETT/EBT  to lips (cm): 23  Number of attempts at approach: 1  Assessment: lips, teeth, and gum same as pre-op and atraumatic intubation

## 2022-03-17 NOTE — DISCHARGE INSTRUCTIONS
ABDOMINAL HERNIA REPAIR  POST OP RECOMMENDATIONS  Dr. Guzman  [phone]    ACTIVITIES:  Expect to rest the day of surgery, but get up several times daily to reduce the risk of getting a clot in your legs.  No strenuous activity or lifting over 10 lbs. for until 6 weeks out from surgery.  Try to avoid squatting and deep bends for about a week.  Do not drive while on pain medicine.  You must be off pain meds 24 hours before driving.  You can climb stairs, but minimize this and initially do one step at a time (both feet on one step rather than going up with each step.)  If an abdominal binder is recommended, wear only when not recumbent.     SYMPTOMS:  Skin blistering is not unusual at the incision due to the thinness of the skin from the chronic herniation.  If this is detected at the post-operative appointment it may simply be treated with a topical antibiotic.  Constipation is common when taking pain medication for surgery.  Over the counter laxatives, such as Miralax and Milk of Magnesium, can be used temporarily.   Fatigue and decreased stamina is not unusual for about a week or so after surgery due to anesthesia.  Try to take walks and some mild activity between resting.  If your procedure was performed laparoscopically as opposed to open, shoulder pain is not unusual from the “gas” used in laparoscopy which will dissipate within 1-3 days.  If it does not, call your physician.    WOUND SITE:  Dressings can be removed 2 days after procedure.The “tega-derm” may be removed in 2 days if instructed to.  Dressings may occasionally have spots of blood on them.  As long as it is dry, these do not need to be changed.  If it is soaked, then the dressing should be removed and a new dressing placed.  Skin irritation, redness or itching can prompt removal of the bandage earlier if present.  Redness or warmth that extends outside of the bandage or is spreading should prompt a call to physician.  Showering may occur while the  “tega-derm” is in place if you do not have a drain.  If it is off, then you must wait 2 days after surgery before showering.  If you have a drain, empty the drain daily and record output.  Keep drain entry site covered.    MEALS:  Eat and Drink very lightly when returning home following surgery.  Jell-O, ginger ale, chicken noodle soup and crackers are good examples.  The day after surgery you may broaden your diet.  Do NOT take pain pills on an empty stomach.    WORK:  In general if you have a sedentary job, you can return to work in 7-10 days at the earliest.  If heavy lifting is required it may be 6 weeks or more.   Any changes to these numbers will be discussed post-operatively.  Use discretion and remember that your stamina will be decreased post operatively.  Return to work notes can be provided at the time of your post-operative appointment.    FOLLOW UP:  Call and make a post-operative appointment for approximately 2 weeks after the procedure.      Sylvia Guzman MD  General, Minimally Invasive and Endoscopic Surgery  [facility], [facility]    [facility] [address] [place] [address]   [address]    [address]  [place] [address]               [place] [address]    P: [phone]  F: [phone]    Cc:  Casa Owen MD

## 2022-03-17 NOTE — OP NOTE
Pre-op Dx:  Recurrent Incisional Hernia    Post-op Dx: Same    Procedure:  Open Recurrent Incisional  Hernia Repair with Stratice Biological Mesh 6cm x 8cm     Surgeon:  Sylvia Guzman M.D.    Assistant:  Dimas Mcrae CSA was responsible for performing the following activities: Retraction, Suction, Irrigation, Suturing, Closing and Placing Dressing and their skilled assistance was necessary for the success of this case.      Anesthesia:  GET    EBL:  Minimum    Specimens:   * No orders in the log *    Complications:  None    Findings:  Recurrent incisional hernia    Indications:  This is a pleasant 46 y.o. male that presented recurrent incisional hernia.     Procedure:     After general endotracheal anesthesia, patient was prepped and draped in the usual sterile fashion. Using a 10 blade scalpel, a skin incision was performed over the palpable incisional hernia. The subcutaneous tissues dissected using cautery. The hernia sac was identified and the subcutaneous tissue was dissected free from the hernia sac in a circumferential manner at the base of the fascia. The hernia sac was opened and the omental contents was freed from the hernia sac using cautery and Metzenbaum scissors. Once the omentum was freed from the hernia sac, it was returned intra-abdominally.  Patient has a history of ascites and approximately 3 L was evacuated.  Meticulous hemostasis was maintained throughout the procedure. The anterior abdominal wall was then inspected through the hernia defect. There was no evidence of any anterior abdominal wall adhesions. The hernia sac was amputated using cautery. An 8cm x6cm biological Stratice mesh was placed posterior to the rectus abdominis muscles and secured in a circumferential manner with 0 Nurolon pop-off's. The fascia was closed using figure eights over the top of the mesh With 0 Nurolon suture on a CT-1 needle. The subcutaneous tissue was copiously irrigated. Subcutaneous tissue and skin  was then infiltrated with quarter percent Marcaine and epinephrine. All needles and sponge counts were correct ×2. The subcutaneous tissue was closed using 3-0 Vicryl and the skin was closed using staples. A sterile dressing was applied and the patient was transferred to recovery room in stable condition.    Sylvia Guzman MD  General, Minimally Invasive and Endoscopic Surgery  [facility] [place] [address] [place] [address]    [address]  [address] [address]    P: [phone]  F: [phone]    Cc:  Casa Owen MD

## 2022-03-17 NOTE — ANESTHESIA POSTPROCEDURE EVALUATION
Patient: Ra Estrada    Procedure Summary     Date: 03/17/22 Room / Location:  [facility] OR 3 /  [facility]    Anesthesia Start: 1220 Anesthesia Stop: 1344    Procedure: VENTRAL/INCISIONAL HERNIA REPAIR OPEN WITH MESH (N/A Abdomen) Diagnosis:       Recurrent hernia      (Recurrent hernia [K46.9])    Surgeons: Sylvia Guzman MD Provider: Beni Bauer CRNA    Anesthesia Type: general ASA Status: 3          Anesthesia Type: general    Vitals  Vitals Value Taken Time   /86 03/17/22 1430   Temp 98.1 °F (36.7 °C) 03/17/22 1342   Pulse 81 03/17/22 1430   Resp 16 03/17/22 1430   SpO2 95 % 03/17/22 1430           Post Anesthesia Care and Evaluation    Patient location during evaluation: PHASE II  Patient participation: complete - patient participated  Level of consciousness: awake  Pain management: adequate  Airway patency: patent  PONV Status: none  Cardiovascular status: acceptable  Respiratory status: acceptable  Hydration status: acceptable    Comments: Pt states his pain is a 7/10 but does not want to wait for a pain pill.  States he wants to go home now.

## 2022-03-28 ENCOUNTER — TELEPHONE (OUTPATIENT)
Dept: SURGERY | Facility: CLINIC | Age: 47
End: 2022-03-28

## 2022-03-28 NOTE — TELEPHONE ENCOUNTER
"Sent Dr Guzman a msg to let her know and tried to call ot and left  for pt to call me back         ----- Message from Ra Estrada sent at 3/28/2022  6:58 AM EDT -----  Regarding: Hernia Repair   could you please refill my pain medication today or tomorrow,  I only have a couple left and I return back to work today \"lite duty\" of course and thee pain although is much better is still there and I know work will make it worse... thx you.....I also need to scudule a follow up appt after this week's over.   #[facility]   #Oxycodone 5mg instant release every 4 hrs    "

## 2023-06-26 PROBLEM — J90 PLEURAL EFFUSION: Status: ACTIVE | Noted: 2023-06-26

## 2023-08-08 ENCOUNTER — HOSPITAL ENCOUNTER (OUTPATIENT)
Dept: RESPIRATORY THERAPY | Facility: HOSPITAL | Age: 48
Discharge: HOME | End: 2023-08-08
Payer: COMMERCIAL

## 2023-08-08 DIAGNOSIS — Z86.711 PERSONAL HISTORY OF PE (PULMONARY EMBOLISM): ICD-10-CM

## 2023-08-08 DIAGNOSIS — J90 PLEURAL EFFUSION: ICD-10-CM

## 2023-08-08 PROCEDURE — 94726 PLETHYSMOGRAPHY LUNG VOLUMES: CPT

## 2023-08-08 PROCEDURE — 94729 DIFFUSING CAPACITY: CPT

## 2023-08-08 PROCEDURE — 94060 EVALUATION OF WHEEZING: CPT

## 2023-08-08 RX ORDER — LEVALBUTEROL INHALATION SOLUTION 1.25 MG/3ML
1.25 SOLUTION RESPIRATORY (INHALATION) ONCE
Status: COMPLETED | OUTPATIENT
Start: 2023-08-08 | End: 2023-08-08

## 2023-08-08 RX ADMIN — LEVALBUTEROL HYDROCHLORIDE 1.25 MG: 1.25 SOLUTION RESPIRATORY (INHALATION) at 15:13

## 2023-08-14 DIAGNOSIS — R94.2 DECREASED DIFFUSION CAPACITY OF LUNG: Primary | ICD-10-CM

## 2023-08-14 DIAGNOSIS — R06.00 DYSPNEA, UNSPECIFIED TYPE: ICD-10-CM

## 2023-08-16 NOTE — PROGRESS NOTES
Primary Care Provider  Casa Owen MD     Referring Provider  No ref. provider found     Chief Complaint  Follow-up (2 month follow up)    Subjective          History of Presenting Illness  Patient is a 48-year-old male, patient of Dr. Rosas who presents for management of pleural effusion and also has a history of pulmonary embolism and is a current cigarette smoker who presents for follow-up visit today.  Patient had a hospitalization back the end of June 2023 beginning of July 2023 for loculated left-sided pleural effusion.  Patient states that since hospital stay he is doing well.  Patient states at times he does become short of breath that is worse with heavy exertion, mild in severity, and improved with rest.  Patient has been albuterol inhaler to use as needed.  Patient states that he has not had his albuterol inhaler at all since last office visit.  Patient states that he is under the care of Dr. Starr for history of cirrhosis and hepatitis C.  Patient states that he is needing a note to return to work without restrictions. Since last office visit patient had a pulmonary function test completed on 8/8/2023.  Report states isolated reduction diffusion capacity may suggest pulmonary vascular disease, anemia, early emphysema.  Patient states that he is scheduled to have an echocardiogram on 9/7/2023.  Patient states that he is still smoking and is not ready to set a quit date at this time.  Patient states that he will continue to try to cut back on smoking on his own and that he is now down to smoking 3 cigarettes a day.  Patient is needing short-term disability/FMLA paperwork completed as he would like to return back to work without restriction. Patient denies fever, chills, night sweats, swollen glands in the head and neck, unintentional weight loss, hemoptysis, purulent sputum production, dysphagia, chest pain, palpitations, chest tightness, abdominal pain, nausea, vomiting, and diarrhea.  Patient also  denies any myalgias, changes in sense of taste and/or smell, sore throat, any other coronavirus or flu-like symptoms.  Patient denies any orthopnea or paroxysmal nocturnal dyspnea.  Patient is able to perform activities of daily living.        Review of Systems   Constitutional:  Negative for activity change, appetite change, chills, diaphoresis, fatigue, fever, unexpected weight gain and unexpected weight loss.        Negative for Insomnia   HENT:  Negative for congestion (Nasal), mouth sores, nosebleeds, postnasal drip, sore throat, swollen glands and trouble swallowing.         Negative for Thrush  Negative for Hoarseness  Negative for Allergies/Hay Fever  Negative for Recent Head injury  Negative for Ear Fullness  Negative for Nasal or Sinus pain  Negative for Dry lips  Negative for Nasal discharge   Respiratory:  Negative for apnea, cough, chest tightness, shortness of breath and wheezing.         Negative for Hemoptysis  Negative for Pleuritic pain   Cardiovascular:  Positive for leg swelling (Intermittent, improved with diuretics per patient report). Negative for chest pain and palpitations.        Negative for Claudication  Negative for Cyanosis  Negative for Dyspnea on exertion   Gastrointestinal:  Negative for abdominal pain, diarrhea, nausea, vomiting and GERD.   Musculoskeletal:  Negative for joint swelling and myalgias.        Negative for Joint pain  Negative for Joint stiffness   Skin:  Negative for color change, dry skin, pallor and rash.   Neurological:  Negative for syncope, weakness and headache.   Hematological:  Negative for adenopathy. Does not bruise/bleed easily.      Family History   Problem Relation Age of Onset    Coronary artery disease Father     Malig Hyperthermia Neg Hx         Social History     Socioeconomic History    Marital status:     Years of education: College   Tobacco Use    Smoking status: Every Day     Packs/day: 2.00     Years: 25.00     Pack years: 50.00     Types:  Cigarettes     Start date: 1990     Passive exposure: Past    Smokeless tobacco: Never    Tobacco comments:     Now only smoking 3 cigarettes a day per patient report   Vaping Use    Vaping Use: Never used   Substance and Sexual Activity    Alcohol use: Not Currently     Comment: last 7/2020    Drug use: Yes     Frequency: 3.0 times per week     Types: Marijuana     Comment: methadone hx  no use sine 2016-  marijuana use twice a week    Sexual activity: Yes     Partners: Female        Past Medical History:   Diagnosis Date    Anxiety     Breast asymmetry 08/2019    Right lump    Cirrhosis     Esophageal varix     Multiple bandings, Dr Giraldo [facility] follows, has bandings every 2 months    GERD (gastroesophageal reflux disease)     History of cirrhosis     Stage III/Dr. Giraldo [facility]    History of GI bleed     History of kidney stones     History of transfusion     Hx of ascites     Hypotestosteronism     Infectious viral hepatitis     2010    Jaundice     Substance abuse     H/O drug use, sees substance abuse doctor    Thrombocytopenia     sees Dr Jay, last 9/2020    Umbilical hernia     sched repair        Immunization History   Administered Date(s) Administered    COVID-19 (PFIZER) Purple Cap Monovalent 03/18/2021, 04/08/2021, 01/04/2022    Influenza, Unspecified 10/18/2020       No Known Allergies       Current Outpatient Medications:     albuterol sulfate  (90 Base) MCG/ACT inhaler, Inhale 2 puffs Every 4 (Four) Hours As Needed for Wheezing or Shortness of Air for up to 30 days., Disp: 18 g, Rfl: 5    furosemide (LASIX) 80 MG tablet, Take 1 tablet by mouth Every Morning., Disp: , Rfl:     spironolactone (ALDACTONE) 100 MG tablet, Take 1 tablet by mouth Daily., Disp: , Rfl:     nadolol (CORGARD) 20 MG tablet, 30 Each, TAKE 1 TABLET BY MOUTH EVERY DAY, 0 Refill(s) (Patient not taking: Reported on 7/17/2023), Disp: , Rfl:     ondansetron (ZOFRAN) 4 MG tablet, TAKE 1 TABLET BY MOUTH EVERY 4 HOURS AS NEEDED  "(Patient not taking: Reported on 7/17/2023), Disp: 30 tablet, Rfl: 1    ondansetron ODT (ZOFRAN-ODT) 4 MG disintegrating tablet, Take 1 tablet by mouth. (Patient not taking: Reported on 7/17/2023), Disp: , Rfl:     potassium chloride 10 MEQ CR tablet, Take 1 tablet by mouth Daily. (Patient not taking: Reported on 8/21/2023), Disp: 30 tablet, Rfl: 5     Objective     Physical Exam  Vital Signs:   WDWN, Alert, NAD.    HEENT:  PERRL, EOMI.  OP, nares clear, no sinus tenderness  Neck:  Supple, no JVD, no thyromegaly.  Lymph: no axillary, cervical, supraclavicular lymphadenopathy noted bilaterally  Chest:  good aeration, clear to auscultation bilaterally, tympanic to percussion bilaterally, no work of breathing noted  CV: RRR, no MGR, pulses 2+, equal.  Abd:  Soft, NT, ND, + BS, no HSM  EXT:  no clubbing, no cyanosis, no edema, no joint tenderness  Neuro:  A&Ox3, CN grossly intact, no focal deficits.  Skin: No rashes or lesions noted.    /78 (BP Location: Right arm, Patient Position: Sitting, Cuff Size: Large Adult)   Pulse 106   Resp 16   Ht 175.3 cm (69.02\")   Wt 83.7 kg (184 lb 9.6 oz)   SpO2 100% Comment: room air  BMI 27.25 kg/mý         Result Review :   I have reviewed EMY Macario last office visit note.  Also reviewed pulmonary function test report dated from 8/8/2023.  See scanned report.    Procedures:           Assessment and Plan      Assessment:  1.  Loculated left-sided pleural effusion: Status post chest tube placement, apparently had for over a year without thoracentesis.  2.  Personal history of pulmonary embolism.  3.  History of hepatitis C, cirrhosis of liver.  Patient is under the care of Dr. Starr.  4.  Chronic liver disease.  5.  Dyspnea on exertion.  6.  Left mid and lower lung collapse.  7.  Decreased diffusion capacity on PFTs.  8.  Tobacco abuse of cigarettes ongoing.        Plan:  1.  Continue albuterol inhaler as needed.  2.  Will order repeat chest x-ray.  Order placed " today.  3.  Patient is scheduled to have an echocardiogram on 9/7/2023.  4.  Patient is needing FMLA/short-term disability paperwork completed.  Paperwork has been given to our nurse navigator.  5.  Patient is needing a work note to be able to return back to work without restrictions.  Work note given to the patient in the office today.  6.  Follow-up with gastroenterologist as scheduled for management of hepatic cirrhosis and hepatitis C infection.  7.  Vaccination status:  patient reports they are up-to-date with COVID vaccines.  Patient declines flu and pneumonia vaccinations.  Discussed with patient the benefits of vaccination including decreased risk of severe illness, hospitalization and death related to COVID-19.  Patient verbalized understanding and will consider getting vaccinated.  Patient is advised to continue to follow CDC recommendations such as social distancing, wearing a mask, and washing hands for least 20 seconds.  8.  Smoking status: patient is a current cigarette smoker.  I counseled the patient on smoking cessation.  I counseled the patient on the risks of continued smoking including the risk of lung cancer, head and neck cancer, renal cancer, heart disease, stroke, and early death.  Patient refuses nicotine replacement therapy or pharmacotherapy at this time.  Patient is advised to decrease the number of cigarettes they are smoking up until the point to where they can quit.  9.  Patient to call the office, 911, or go to the ER with new or worsening symptoms.  10.  Follow-up in September 2023 with Dr. Pa, sooner if needed.              Follow Up   Return for with Dr. Pa in September 2023.  Patient was given instructions and counseling regarding his condition or for health maintenance advice. Please see specific information pulled into the AVS if appropriate.

## 2023-08-21 ENCOUNTER — OFFICE VISIT (OUTPATIENT)
Dept: PULMONOLOGY | Facility: CLINIC | Age: 48
End: 2023-08-21
Payer: COMMERCIAL

## 2023-08-21 VITALS
SYSTOLIC BLOOD PRESSURE: 126 MMHG | WEIGHT: 184.6 LBS | HEIGHT: 69 IN | RESPIRATION RATE: 16 BRPM | HEART RATE: 106 BPM | OXYGEN SATURATION: 100 % | BODY MASS INDEX: 27.34 KG/M2 | DIASTOLIC BLOOD PRESSURE: 78 MMHG

## 2023-08-21 DIAGNOSIS — Z86.711 HISTORY OF PULMONARY EMBOLISM: ICD-10-CM

## 2023-08-21 DIAGNOSIS — K74.60 HEPATIC CIRRHOSIS, UNSPECIFIED HEPATIC CIRRHOSIS TYPE, UNSPECIFIED WHETHER ASCITES PRESENT: ICD-10-CM

## 2023-08-21 DIAGNOSIS — R06.00 DYSPNEA, UNSPECIFIED TYPE: Primary | ICD-10-CM

## 2023-08-21 DIAGNOSIS — Z72.0 TOBACCO ABUSE: ICD-10-CM

## 2023-08-21 DIAGNOSIS — R06.09 DYSPNEA ON EXERTION: ICD-10-CM

## 2023-08-21 DIAGNOSIS — B19.20 HEPATITIS C VIRUS INFECTION WITHOUT HEPATIC COMA, UNSPECIFIED CHRONICITY: ICD-10-CM

## 2023-08-21 PROCEDURE — 1159F MED LIST DOCD IN RCRD: CPT | Performed by: NURSE PRACTITIONER

## 2023-08-21 PROCEDURE — 1160F RVW MEDS BY RX/DR IN RCRD: CPT | Performed by: NURSE PRACTITIONER

## 2023-08-21 PROCEDURE — 99214 OFFICE O/P EST MOD 30 MIN: CPT | Performed by: NURSE PRACTITIONER

## 2023-08-21 RX ORDER — ALBUTEROL SULFATE 90 UG/1
2 AEROSOL, METERED RESPIRATORY (INHALATION) EVERY 4 HOURS PRN
Qty: 18 G | Refills: 5 | Status: SHIPPED | OUTPATIENT
Start: 2023-08-21 | End: 2023-09-20

## 2023-08-21 RX ORDER — ALBUTEROL SULFATE 90 UG/1
2 AEROSOL, METERED RESPIRATORY (INHALATION) EVERY 4 HOURS PRN
COMMUNITY
End: 2023-08-21 | Stop reason: SDUPTHER

## 2023-10-02 ENCOUNTER — OFFICE VISIT (OUTPATIENT)
Dept: PULMONOLOGY | Facility: CLINIC | Age: 48
End: 2023-10-02
Payer: COMMERCIAL

## 2023-10-02 VITALS
OXYGEN SATURATION: 99 % | TEMPERATURE: 98.7 F | HEART RATE: 83 BPM | BODY MASS INDEX: 27.55 KG/M2 | DIASTOLIC BLOOD PRESSURE: 84 MMHG | HEIGHT: 69 IN | SYSTOLIC BLOOD PRESSURE: 129 MMHG | WEIGHT: 186 LBS | RESPIRATION RATE: 18 BRPM

## 2023-10-02 DIAGNOSIS — R94.2 ABNORMAL PFT: ICD-10-CM

## 2023-10-02 DIAGNOSIS — Z72.0 TOBACCO ABUSE: Primary | ICD-10-CM

## 2023-10-02 RX ORDER — ALBUTEROL SULFATE 90 UG/1
2 AEROSOL, METERED RESPIRATORY (INHALATION) EVERY 4 HOURS PRN
COMMUNITY

## 2023-10-02 NOTE — PROGRESS NOTES
Primary Care Provider  Casa Owen MD   Referring Provider  No ref. provider found      Patient Complaint  Follow-up (1 Month)      Subjective          Ra Estrada presents to [facility]      History of Presenting Illness  Ra Estrada is a 48 y.o. male with history of PE, pleural effusion, tobacco, cirrhosis, hepatitis C use here for follow-up.      I have personally reviewed the review of systems, past family, social, medical and surgical histories; and agree with their findings.      Review of Systems  Constitutional:  No fever. No chills. No weakness.  ENT:  No sore throat, URI symptoms.   Cardiovascular:  No chest pain. No palpitations. No lower extremity edema.  Respiratory:  No shortness of breath, cough, pleuritic chest pain. No hemoptysis. No dyspnea.   GI:  Normal appetite. No nausea, vomiting, diarrhea. No melena.  Musculoskeletal:  No arthralgias or myalgias.  Neurologic:  No weakness    Family History   Problem Relation Age of Onset    Coronary artery disease Father     Malig Hyperthermia Neg Hx         Social History     Socioeconomic History    Marital status:     Years of education: College   Tobacco Use    Smoking status: Every Day     Packs/day: 0.50     Years: 25.00     Pack years: 12.50     Types: Cigarettes     Start date: 1990     Passive exposure: Past    Smokeless tobacco: Never    Tobacco comments:     Now only smoking 3 cigarettes a day per patient report   Vaping Use    Vaping Use: Never used   Substance and Sexual Activity    Alcohol use: Not Currently     Comment: last 7/2020    Drug use: Yes     Frequency: 3.0 times per week     Types: Marijuana     Comment: methadone hx  no use sine 2016-  marijuana use twice a week    Sexual activity: Yes     Partners: Female        Past Medical History:   Diagnosis Date    Anxiety     Breast asymmetry 08/2019    Right lump    Cirrhosis     Esophageal varix     Multiple bandings,  "Dr Giraldo [facility] follows, has bandings every 2 months    GERD (gastroesophageal reflux disease)     History of cirrhosis     Stage III/Dr. Giraldo [facility]    History of GI bleed     History of kidney stones     History of transfusion     Hx of ascites     Hypotestosteronism     Infectious viral hepatitis     2010    Jaundice     Substance abuse     H/O drug use, sees substance abuse doctor    Thrombocytopenia     sees Dr Jay, last 9/2020    Umbilical hernia     sched repair        Immunization History   Administered Date(s) Administered    COVID-19 (PFIZER) Purple Cap Monovalent 03/18/2021, 04/08/2021, 01/04/2022    Influenza, Unspecified 10/18/2020       No Known Allergies       Current Outpatient Medications:     albuterol sulfate  (90 Base) MCG/ACT inhaler, Inhale 2 puffs Every 4 (Four) Hours As Needed for Wheezing., Disp: , Rfl:     furosemide (LASIX) 80 MG tablet, Take 1 tablet by mouth Every Morning., Disp: , Rfl:     ondansetron (ZOFRAN) 4 MG tablet, TAKE 1 TABLET BY MOUTH EVERY 4 HOURS AS NEEDED, Disp: 30 tablet, Rfl: 1    ondansetron ODT (ZOFRAN-ODT) 4 MG disintegrating tablet, Take 1 tablet by mouth., Disp: , Rfl:     potassium chloride 10 MEQ CR tablet, Take 1 tablet by mouth Daily., Disp: 30 tablet, Rfl: 5    spironolactone (ALDACTONE) 100 MG tablet, Take 1 tablet by mouth Daily., Disp: , Rfl:     nadolol (CORGARD) 20 MG tablet, 30 Each, TAKE 1 TABLET BY MOUTH EVERY DAY, 0 Refill(s) (Patient not taking: Reported on 7/17/2023), Disp: , Rfl:      Objective     Vital Signs:   /84 (BP Location: Right arm, Patient Position: Sitting, Cuff Size: Adult)   Pulse 83   Temp 98.7 °F (37.1 °C) (Temporal)   Resp 18   Ht 175.3 cm (69\")   Wt 84.4 kg (186 lb)   SpO2 99% Comment: Room air  BMI 27.47 kg/m²     Physical Exam  Vital Signs Reviewed   WDWN, Alert, NAD.    HEENT:  EOMI.  nares clear  Neck:  Supple, no JVD, no thyromegaly  Chest:  clear to auscultation bilaterally, no wheezes, crackles; no work of " breathing noted  CV: RRR, no M/G/R, pulses 2+, equal.  Abd:  Soft, NT, ND, +BS  EXT:  no clubbing, no cyanosis, no edema  Neuro:  A&Ox3, CN grossly intact, no focal deficits.  Skin: No rashes or lesions noted       Result Review :   I have personally reviewed patient's labs and images.              Patient Active Problem List   Diagnosis    Thrombocytopenia    Anemia    Liver cirrhosis    Umbilical hernia without obstruction and without gangrene    Recurrent umbilical hernia    Tobacco dependence due to cigarettes    Esophageal varices    Gastroesophageal reflux disease    Closed displaced fracture of right femoral neck    Hepatitis C virus infection    Recurrent hernia    Pleural effusion    Dyspnea    Tobacco abuse    History of pulmonary embolism       Impression and Plan    History of left-sided loculated effusion status post chest tube placement  History of pulmonary embolism  History of cirrhosis, hepatitis C  Tobacco use, 1/2 pack/day    -PFTs done 8/8/2023 showed isolated reduction in DLCO.  This may suggest pulmonary vascular disease, anemia, or early emphysema.  -2D echo ordered 08/2023 but has not been done, patient has some insurance issues which are now resolved.  Will reorder test  -Patient's symptoms are well controlled with just as needed albuterol.  Continue for now  -Continue to discuss tobacco cessation next visit  -Follow-up in 5 months or earlier as needed    Smoking status: Reviewed  Vaccination status: Patient refused vaccinations at this time  Medications personally reviewed    Follow Up   No follow-ups on file.  Patient was given instructions and counseling regarding his condition or for health maintenance advice. Please see specific information pulled into the AVS if appropriate.

## 2023-10-14 ENCOUNTER — APPOINTMENT (OUTPATIENT)
Dept: CT IMAGING | Facility: HOSPITAL | Age: 48
End: 2023-10-14
Payer: COMMERCIAL

## 2023-10-14 ENCOUNTER — HOSPITAL ENCOUNTER (EMERGENCY)
Facility: HOSPITAL | Age: 48
Discharge: ANOTHER HOSPITAL | End: 2023-10-15
Attending: EMERGENCY MEDICINE | Admitting: EMERGENCY MEDICINE
Payer: COMMERCIAL

## 2023-10-14 DIAGNOSIS — S06.6X9A SUBARACHNOID HEMATOMA WITH LOSS OF CONSCIOUSNESS, INITIAL ENCOUNTER: Primary | ICD-10-CM

## 2023-10-14 LAB
ALBUMIN SERPL-MCNC: 3.6 G/DL (ref 3.5–5.2)
ALBUMIN/GLOB SERPL: 1.2 G/DL
ALP SERPL-CCNC: 192 U/L (ref 39–117)
ALT SERPL W P-5'-P-CCNC: 50 U/L (ref 1–41)
ANION GAP SERPL CALCULATED.3IONS-SCNC: 8.7 MMOL/L (ref 5–15)
AST SERPL-CCNC: 49 U/L (ref 1–40)
BASOPHILS # BLD AUTO: 0.02 10*3/MM3 (ref 0–0.2)
BASOPHILS # BLD AUTO: 0.03 10*3/MM3 (ref 0–0.2)
BASOPHILS NFR BLD AUTO: 0.4 % (ref 0–1.5)
BASOPHILS NFR BLD AUTO: 0.6 % (ref 0–1.5)
BILIRUB SERPL-MCNC: 0.8 MG/DL (ref 0–1.2)
BUN SERPL-MCNC: 10 MG/DL (ref 6–20)
BUN/CREAT SERPL: 11.5 (ref 7–25)
CALCIUM SPEC-SCNC: 9.1 MG/DL (ref 8.6–10.5)
CHLORIDE SERPL-SCNC: 107 MMOL/L (ref 98–107)
CO2 SERPL-SCNC: 23.3 MMOL/L (ref 22–29)
CREAT SERPL-MCNC: 0.87 MG/DL (ref 0.76–1.27)
DEPRECATED RDW RBC AUTO: 54.7 FL (ref 37–54)
DEPRECATED RDW RBC AUTO: 55 FL (ref 37–54)
EGFRCR SERPLBLD CKD-EPI 2021: 105.8 ML/MIN/1.73
EOSINOPHIL # BLD AUTO: 0.09 10*3/MM3 (ref 0–0.4)
EOSINOPHIL # BLD AUTO: 0.24 10*3/MM3 (ref 0–0.4)
EOSINOPHIL NFR BLD AUTO: 1.8 % (ref 0.3–6.2)
EOSINOPHIL NFR BLD AUTO: 4.5 % (ref 0.3–6.2)
ERYTHROCYTE [DISTWIDTH] IN BLOOD BY AUTOMATED COUNT: 14.8 % (ref 12.3–15.4)
ERYTHROCYTE [DISTWIDTH] IN BLOOD BY AUTOMATED COUNT: 14.9 % (ref 12.3–15.4)
GLOBULIN UR ELPH-MCNC: 3 GM/DL
GLUCOSE SERPL-MCNC: 136 MG/DL (ref 65–99)
HCT VFR BLD AUTO: 40.7 % (ref 37.5–51)
HCT VFR BLD AUTO: 42 % (ref 37.5–51)
HGB BLD-MCNC: 13.7 G/DL (ref 13–17.7)
HGB BLD-MCNC: 14.1 G/DL (ref 13–17.7)
HOLD SPECIMEN: NORMAL
HOLD SPECIMEN: NORMAL
IMM GRANULOCYTES # BLD AUTO: 0.01 10*3/MM3 (ref 0–0.05)
IMM GRANULOCYTES # BLD AUTO: 0.02 10*3/MM3 (ref 0–0.05)
IMM GRANULOCYTES NFR BLD AUTO: 0.2 % (ref 0–0.5)
IMM GRANULOCYTES NFR BLD AUTO: 0.4 % (ref 0–0.5)
LYMPHOCYTES # BLD AUTO: 0.5 10*3/MM3 (ref 0.7–3.1)
LYMPHOCYTES # BLD AUTO: 1.14 10*3/MM3 (ref 0.7–3.1)
LYMPHOCYTES NFR BLD AUTO: 10 % (ref 19.6–45.3)
LYMPHOCYTES NFR BLD AUTO: 21.6 % (ref 19.6–45.3)
MAGNESIUM SERPL-MCNC: 2.1 MG/DL (ref 1.6–2.6)
MCH RBC QN AUTO: 33.4 PG (ref 26.6–33)
MCH RBC QN AUTO: 33.8 PG (ref 26.6–33)
MCHC RBC AUTO-ENTMCNC: 33.6 G/DL (ref 31.5–35.7)
MCHC RBC AUTO-ENTMCNC: 33.7 G/DL (ref 31.5–35.7)
MCV RBC AUTO: 100.5 FL (ref 79–97)
MCV RBC AUTO: 99.5 FL (ref 79–97)
MONOCYTES # BLD AUTO: 0.57 10*3/MM3 (ref 0.1–0.9)
MONOCYTES # BLD AUTO: 0.87 10*3/MM3 (ref 0.1–0.9)
MONOCYTES NFR BLD AUTO: 11.4 % (ref 5–12)
MONOCYTES NFR BLD AUTO: 16.5 % (ref 5–12)
NEUTROPHILS NFR BLD AUTO: 2.99 10*3/MM3 (ref 1.7–7)
NEUTROPHILS NFR BLD AUTO: 3.82 10*3/MM3 (ref 1.7–7)
NEUTROPHILS NFR BLD AUTO: 56.6 % (ref 42.7–76)
NEUTROPHILS NFR BLD AUTO: 76 % (ref 42.7–76)
NRBC BLD AUTO-RTO: 0 /100 WBC (ref 0–0.2)
NRBC BLD AUTO-RTO: 0 /100 WBC (ref 0–0.2)
PLATELET # BLD AUTO: 75 10*3/MM3 (ref 140–450)
PLATELET # BLD AUTO: 85 10*3/MM3 (ref 140–450)
PMV BLD AUTO: 10.9 FL (ref 6–12)
PMV BLD AUTO: 10.9 FL (ref 6–12)
POTASSIUM SERPL-SCNC: 3.9 MMOL/L (ref 3.5–5.2)
PROT SERPL-MCNC: 6.6 G/DL (ref 6–8.5)
RBC # BLD AUTO: 4.05 10*6/MM3 (ref 4.14–5.8)
RBC # BLD AUTO: 4.22 10*6/MM3 (ref 4.14–5.8)
SODIUM SERPL-SCNC: 139 MMOL/L (ref 136–145)
TROPONIN T SERPL HS-MCNC: 8 NG/L
WBC NRBC COR # BLD: 5.02 10*3/MM3 (ref 3.4–10.8)
WBC NRBC COR # BLD: 5.28 10*3/MM3 (ref 3.4–10.8)
WHOLE BLOOD HOLD COAG: NORMAL
WHOLE BLOOD HOLD SPECIMEN: NORMAL

## 2023-10-14 PROCEDURE — 84484 ASSAY OF TROPONIN QUANT: CPT

## 2023-10-14 PROCEDURE — 96374 THER/PROPH/DIAG INJ IV PUSH: CPT

## 2023-10-14 PROCEDURE — 80053 COMPREHEN METABOLIC PANEL: CPT

## 2023-10-14 PROCEDURE — 93005 ELECTROCARDIOGRAM TRACING: CPT | Performed by: EMERGENCY MEDICINE

## 2023-10-14 PROCEDURE — 70450 CT HEAD/BRAIN W/O DYE: CPT

## 2023-10-14 PROCEDURE — 96375 TX/PRO/DX INJ NEW DRUG ADDON: CPT

## 2023-10-14 PROCEDURE — 25810000003 SODIUM CHLORIDE 0.9 % SOLUTION: Performed by: EMERGENCY MEDICINE

## 2023-10-14 PROCEDURE — 99284 EMERGENCY DEPT VISIT MOD MDM: CPT

## 2023-10-14 PROCEDURE — 96372 THER/PROPH/DIAG INJ SC/IM: CPT

## 2023-10-14 PROCEDURE — 83735 ASSAY OF MAGNESIUM: CPT

## 2023-10-14 PROCEDURE — 25010000002 KETOROLAC TROMETHAMINE PER 15 MG: Performed by: EMERGENCY MEDICINE

## 2023-10-14 PROCEDURE — 85025 COMPLETE CBC W/AUTO DIFF WBC: CPT | Performed by: EMERGENCY MEDICINE

## 2023-10-14 PROCEDURE — 85025 COMPLETE CBC W/AUTO DIFF WBC: CPT

## 2023-10-14 PROCEDURE — 93005 ELECTROCARDIOGRAM TRACING: CPT

## 2023-10-14 PROCEDURE — 25010000002 DIPHENHYDRAMINE PER 50 MG: Performed by: EMERGENCY MEDICINE

## 2023-10-14 PROCEDURE — 25010000002 METOCLOPRAMIDE PER 10 MG: Performed by: EMERGENCY MEDICINE

## 2023-10-14 PROCEDURE — 87637 SARSCOV2&INF A&B&RSV AMP PRB: CPT | Performed by: EMERGENCY MEDICINE

## 2023-10-14 PROCEDURE — 36415 COLL VENOUS BLD VENIPUNCTURE: CPT

## 2023-10-14 RX ORDER — SODIUM CHLORIDE 0.9 % (FLUSH) 0.9 %
10 SYRINGE (ML) INJECTION AS NEEDED
Status: DISCONTINUED | OUTPATIENT
Start: 2023-10-14 | End: 2023-10-15 | Stop reason: HOSPADM

## 2023-10-14 RX ORDER — KETOROLAC TROMETHAMINE 30 MG/ML
60 INJECTION, SOLUTION INTRAMUSCULAR; INTRAVENOUS ONCE
Status: COMPLETED | OUTPATIENT
Start: 2023-10-14 | End: 2023-10-14

## 2023-10-14 RX ORDER — DIPHENHYDRAMINE HYDROCHLORIDE 50 MG/ML
12.5 INJECTION INTRAMUSCULAR; INTRAVENOUS ONCE
Status: COMPLETED | OUTPATIENT
Start: 2023-10-14 | End: 2023-10-14

## 2023-10-14 RX ORDER — METOCLOPRAMIDE HYDROCHLORIDE 5 MG/ML
10 INJECTION INTRAMUSCULAR; INTRAVENOUS ONCE
Status: COMPLETED | OUTPATIENT
Start: 2023-10-14 | End: 2023-10-14

## 2023-10-14 RX ORDER — ACETAMINOPHEN 500 MG
1000 TABLET ORAL ONCE
Status: DISCONTINUED | OUTPATIENT
Start: 2023-10-14 | End: 2023-10-15 | Stop reason: HOSPADM

## 2023-10-14 RX ADMIN — DIPHENHYDRAMINE HYDROCHLORIDE 12.5 MG: 50 INJECTION, SOLUTION INTRAMUSCULAR; INTRAVENOUS at 22:42

## 2023-10-14 RX ADMIN — KETOROLAC TROMETHAMINE 60 MG: 60 INJECTION, SOLUTION INTRAMUSCULAR at 22:43

## 2023-10-14 RX ADMIN — METOCLOPRAMIDE HYDROCHLORIDE 10 MG: 5 INJECTION INTRAMUSCULAR; INTRAVENOUS at 22:43

## 2023-10-14 RX ADMIN — SODIUM CHLORIDE 1000 ML: 9 INJECTION, SOLUTION INTRAVENOUS at 22:44

## 2023-10-15 VITALS
SYSTOLIC BLOOD PRESSURE: 123 MMHG | DIASTOLIC BLOOD PRESSURE: 65 MMHG | TEMPERATURE: 97.5 F | BODY MASS INDEX: 26.95 KG/M2 | HEART RATE: 73 BPM | RESPIRATION RATE: 15 BRPM | OXYGEN SATURATION: 95 % | HEIGHT: 70 IN | WEIGHT: 188.27 LBS

## 2023-10-15 LAB
ALBUMIN SERPL-MCNC: 3.5 G/DL (ref 3.5–5.2)
ALBUMIN/GLOB SERPL: 1.2 G/DL
ALP SERPL-CCNC: 185 U/L (ref 39–117)
ALT SERPL W P-5'-P-CCNC: 48 U/L (ref 1–41)
ANION GAP SERPL CALCULATED.3IONS-SCNC: 8.1 MMOL/L (ref 5–15)
AST SERPL-CCNC: 46 U/L (ref 1–40)
BILIRUB SERPL-MCNC: 0.7 MG/DL (ref 0–1.2)
BUN SERPL-MCNC: 10 MG/DL (ref 6–20)
BUN/CREAT SERPL: 12 (ref 7–25)
CALCIUM SPEC-SCNC: 8.7 MG/DL (ref 8.6–10.5)
CHLORIDE SERPL-SCNC: 110 MMOL/L (ref 98–107)
CO2 SERPL-SCNC: 22.9 MMOL/L (ref 22–29)
CREAT SERPL-MCNC: 0.83 MG/DL (ref 0.76–1.27)
EGFRCR SERPLBLD CKD-EPI 2021: 107.3 ML/MIN/1.73
FLUAV SUBTYP SPEC NAA+PROBE: NOT DETECTED
FLUBV RNA ISLT QL NAA+PROBE: NOT DETECTED
GLOBULIN UR ELPH-MCNC: 2.9 GM/DL
GLUCOSE SERPL-MCNC: 170 MG/DL (ref 65–99)
POTASSIUM SERPL-SCNC: 3.8 MMOL/L (ref 3.5–5.2)
PROT SERPL-MCNC: 6.4 G/DL (ref 6–8.5)
QT INTERVAL: 420 MS
QTC INTERVAL: 431 MS
RSV RNA NPH QL NAA+NON-PROBE: NOT DETECTED
SARS-COV-2 RNA RESP QL NAA+PROBE: NOT DETECTED
SODIUM SERPL-SCNC: 141 MMOL/L (ref 136–145)

## 2023-10-15 PROCEDURE — 80053 COMPREHEN METABOLIC PANEL: CPT | Performed by: EMERGENCY MEDICINE

## 2023-10-15 NOTE — ED PROVIDER NOTES
Time: 10:27 PM EDT  Date of encounter:  10/14/2023  Independent Historian/Clinical History and Information was obtained by:   Patient    History is limited by: N/A    Chief Complaint: Headache      History of Present Illness:  Patient is a 48 y.o. year old male who presents to the emergency department for evaluation of headache that started earlier today.  Patient reports he has not had many migraines in his life.  Patient denies subjective neurological doves including numbness and tingling.  Patient has no chest pain or shortness of breath.  Patient has no cough hemoptysis.  Patient denies dysuria and urinary frequency.  Patient denies blurred vision.  Patient does report subjective fever with no chills.    HPI    Patient Care Team  Primary Care Provider: Casa Owen MD    Past Medical History:     No Known Allergies  Past Medical History:   Diagnosis Date    Anxiety     Breast asymmetry 08/2019    Right lump    Cirrhosis     Esophageal varix     Multiple bandings, Dr Giraldo [facility] follows, has bandings every 2 months    GERD (gastroesophageal reflux disease)     History of cirrhosis     Stage III/Dr. Giraldo [facility]    History of GI bleed     History of kidney stones     History of transfusion     Hx of ascites     Hypotestosteronism     Infectious viral hepatitis     2010    Jaundice     Substance abuse     H/O drug use, sees substance abuse doctor    Thrombocytopenia     sees Dr Jay, last 9/2020    Umbilical hernia     sched repair     Past Surgical History:   Procedure Laterality Date    APPENDECTOMY      ENDOSCOPY      ENDOSCOPY W/ BANDING      multiple, last 9/25/20    ESOPHAGUS SURGERY      Banding more than 10 times    ORIF FEMUR FRACTURE Right 10/2019    PITUITARY SURGERY      drained    SHOULDER ARTHROSCOPY Right 1995    Rotator cuff    UMBILICAL HERNIA REPAIR N/A 8/13/2020    Procedure: UMBILICAL HERNIA REPAIR;  Surgeon: Sylvia Guzman MD;  Location: [facility];  Service: General;  Laterality: N/A;     UMBILICAL HERNIA REPAIR N/A 10/15/2020    Procedure: UMBILICAL HERNIA REPAIR WITH MESH;  Surgeon: Sylvia Guzman MD;  Location:  [facility];  Service: General;  Laterality: N/A;    VENTRAL/INCISIONAL HERNIA REPAIR N/A 3/17/2022    Procedure: VENTRAL/INCISIONAL HERNIA REPAIR OPEN WITH MESH;  Surgeon: Sylvia Guzman MD;  Location:  [facility];  Service: General;  Laterality: N/A;     Family History   Problem Relation Age of Onset    Coronary artery disease Father     Malig Hyperthermia Neg Hx        Home Medications:  Prior to Admission medications    Medication Sig Start Date End Date Taking? Authorizing Provider   albuterol sulfate  (90 Base) MCG/ACT inhaler Inhale 2 puffs Every 4 (Four) Hours As Needed for Wheezing.    Tommy Rahman MD   furosemide (LASIX) 80 MG tablet Take 1 tablet by mouth Every Morning. 2/23/22   Tommy Rahman MD   nadolol (CORGARD) 20 MG tablet 30 Each, TAKE 1 TABLET BY MOUTH EVERY DAY, 0 Refill(s)  Patient not taking: Reported on 7/17/2023 5/2/23   Tommy Rahman MD   ondansetron (ZOFRAN) 4 MG tablet TAKE 1 TABLET BY MOUTH EVERY 4 HOURS AS NEEDED 12/13/21   Sylvia Guzman MD   ondansetron ODT (ZOFRAN-ODT) 4 MG disintegrating tablet Take 1 tablet by mouth. 5/17/23   Tommy Rahman MD   potassium chloride 10 MEQ CR tablet Take 1 tablet by mouth Daily. 7/17/23   Elly Ortega APRN   spironolactone (ALDACTONE) 100 MG tablet Take 1 tablet by mouth Daily. 1/14/20   Tommy Rahman MD        Social History:   Social History     Tobacco Use    Smoking status: Every Day     Packs/day: 0.50     Years: 25.00     Additional pack years: 0.00     Total pack years: 12.50     Types: Cigarettes     Start date: 1990     Passive exposure: Past    Smokeless tobacco: Never    Tobacco comments:     Now only smoking 3 cigarettes a day per patient report   Vaping Use    Vaping Use: Never used   Substance Use Topics    Alcohol use: Not Currently     Comment:  "last 7/2020    Drug use: Yes     Frequency: 3.0 times per week     Types: Marijuana     Comment: methadone hx  no use sine 2016-  marijuana use twice a week         Review of Systems:  Review of Systems   Constitutional:  Negative for chills and fever.   HENT:  Negative for congestion, rhinorrhea and sore throat.    Eyes:  Negative for pain and visual disturbance.   Respiratory:  Negative for apnea, cough, chest tightness and shortness of breath.    Cardiovascular:  Negative for chest pain and palpitations.   Gastrointestinal:  Negative for abdominal pain, diarrhea, nausea and vomiting.   Genitourinary:  Negative for difficulty urinating and dysuria.   Musculoskeletal:  Negative for joint swelling and myalgias.   Skin:  Negative for color change.   Neurological:  Negative for seizures and headaches.   Psychiatric/Behavioral: Negative.     All other systems reviewed and are negative.       Physical Exam:  /65   Pulse 73   Temp 97.5 °F (36.4 °C) (Oral)   Resp 15   Ht 177.8 cm (70\")   Wt 85.4 kg (188 lb 4.4 oz)   SpO2 95%   BMI 27.01 kg/m²     Physical Exam  Vitals and nursing note reviewed.   Constitutional:       General: He is not in acute distress.     Appearance: Normal appearance. He is not toxic-appearing.   HENT:      Head: Normocephalic and atraumatic.      Jaw: There is normal jaw occlusion.   Eyes:      General: Lids are normal.      Extraocular Movements: Extraocular movements intact.      Conjunctiva/sclera: Conjunctivae normal.      Pupils: Pupils are equal, round, and reactive to light.   Cardiovascular:      Rate and Rhythm: Normal rate and regular rhythm.      Pulses: Normal pulses.      Heart sounds: Normal heart sounds.   Pulmonary:      Effort: Pulmonary effort is normal. No respiratory distress.      Breath sounds: Normal breath sounds. No wheezing or rhonchi.   Abdominal:      General: Abdomen is flat.      Palpations: Abdomen is soft.      Tenderness: There is no abdominal " tenderness. There is no guarding or rebound.   Musculoskeletal:         General: Normal range of motion.      Cervical back: Normal range of motion and neck supple.      Right lower leg: No edema.      Left lower leg: No edema.   Skin:     General: Skin is warm and dry.   Neurological:      Mental Status: He is alert and oriented to person, place, and time. Mental status is at baseline.   Psychiatric:         Mood and Affect: Mood normal.                  Procedures:  Procedures      Medical Decision Making:      Comorbidities that affect care:    Hypertension    External Notes reviewed:    Patient was recently admitted to the hospital for a large pleural effusion      The following orders were placed and all results were independently analyzed by me:  Orders Placed This Encounter   Procedures    COVID PRE-OP / PRE-PROCEDURE SCREENING ORDER (NO ISOLATION) - Swab, Nasopharynx    COVID-19, FLU A/B, RSV PCR - Swab, Nasopharynx    CT Head Without Contrast    [place] Draw    Comprehensive Metabolic Panel    Magnesium    Single High Sensitivity Troponin T    CBC Auto Differential    Comprehensive Metabolic Panel    CBC Auto Differential    NPO Diet NPO Type: Strict NPO    Undress & Gown    Continuous Pulse Oximetry    Vital Signs    Orthostatic Blood Pressure    General MD Inpatient Consult    Oxygen Therapy- Nasal Cannula; Titrate 1-6 LPM Per SpO2; 90 - 95%    POC Glucose Once    ECG 12 Lead ED Triage Standing Order; Syncope    ECG 12 Lead Syncope    Insert Peripheral IV    CBC & Differential    Green Top (Gel)    Lavender Top    Gold Top - SST    Light Blue Top    CBC & Differential       Medications Given in the Emergency Department:  Medications   sodium chloride 0.9 % flush 10 mL (has no administration in time range)   acetaminophen (TYLENOL) tablet 1,000 mg (0 mg Oral Hold 10/14/23 2251)   sodium chloride 0.9 % bolus 1,000 mL (0 mL Intravenous Stopped 10/15/23 0022)   metoclopramide (REGLAN) injection 10 mg (10 mg  Intravenous Given 10/14/23 2243)   diphenhydrAMINE (BENADRYL) injection 12.5 mg (12.5 mg Intravenous Given 10/14/23 2242)   ketorolac (TORADOL) injection 60 mg (60 mg Intramuscular Given 10/14/23 2243)        ED Course:         Labs:    Lab Results (last 24 hours)       Procedure Component Value Units Date/Time    CBC & Differential [[id number]]  (Abnormal) Collected: 10/14/23 2107    Specimen: Blood from Arm, Left Updated: 10/14/23 2133    Narrative:      The following orders were created for panel order CBC & Differential.  Procedure                               Abnormality         Status                     ---------                               -----------         ------                     CBC Auto Differential[[id number]]        Abnormal            Final result                 Please view results for these tests on the individual orders.    Comprehensive Metabolic Panel [[id number]]  (Abnormal) Collected: 10/14/23 2107    Specimen: Blood from Arm, Left Updated: 10/14/23 2153     Glucose 136 mg/dL      BUN 10 mg/dL      Creatinine 0.87 mg/dL      Sodium 139 mmol/L      Potassium 3.9 mmol/L      Chloride 107 mmol/L      CO2 23.3 mmol/L      Calcium 9.1 mg/dL      Total Protein 6.6 g/dL      Albumin 3.6 g/dL      ALT (SGPT) 50 U/L      AST (SGOT) 49 U/L      Alkaline Phosphatase 192 U/L      Total Bilirubin 0.8 mg/dL      Globulin 3.0 gm/dL      A/G Ratio 1.2 g/dL      BUN/Creatinine Ratio 11.5     Anion Gap 8.7 mmol/L      eGFR 105.8 mL/min/1.73     Narrative:      GFR Normal >60  Chronic Kidney Disease <60  Kidney Failure <15      Magnesium [[id number]]  (Normal) Collected: 10/14/23 2107    Specimen: Blood from Arm, Left Updated: 10/14/23 2153     Magnesium 2.1 mg/dL     Single High Sensitivity Troponin T [[id number]]  (Normal) Collected: 10/14/23 2107    Specimen: Blood from Arm, Left Updated: 10/14/23 2153     HS Troponin T 8 ng/L     Narrative:      High Sensitive Troponin T Reference Range:  <10.0 ng/L-  Negative Female for AMI  <15.0 ng/L- Negative Male for AMI  >=10 - Abnormal Female indicating possible myocardial injury.  >=15 - Abnormal Male indicating possible myocardial injury.   Clinicians would have to utilize clinical acumen, EKG, Troponin, and serial changes to determine if it is an Acute Myocardial Infarction or myocardial injury due to an underlying chronic condition.         CBC Auto Differential [[id number]]  (Abnormal) Collected: 10/14/23 2107    Specimen: Blood from Arm, Left Updated: 10/14/23 2133     WBC 5.28 10*3/mm3      RBC 4.05 10*6/mm3      Hemoglobin 13.7 g/dL      Hematocrit 40.7 %      .5 fL      MCH 33.8 pg      MCHC 33.7 g/dL      RDW 14.9 %      RDW-SD 55.0 fl      MPV 10.9 fL      Platelets 85 10*3/mm3      Neutrophil % 56.6 %      Lymphocyte % 21.6 %      Monocyte % 16.5 %      Eosinophil % 4.5 %      Basophil % 0.6 %      Immature Grans % 0.2 %      Neutrophils, Absolute 2.99 10*3/mm3      Lymphocytes, Absolute 1.14 10*3/mm3      Monocytes, Absolute 0.87 10*3/mm3      Eosinophils, Absolute 0.24 10*3/mm3      Basophils, Absolute 0.03 10*3/mm3      Immature Grans, Absolute 0.01 10*3/mm3      nRBC 0.0 /100 WBC     COVID PRE-OP / PRE-PROCEDURE SCREENING ORDER (NO ISOLATION) - Swab, Nasopharynx [[id number]]  (Normal) Collected: 10/14/23 2311    Specimen: Swab from Nasopharynx Updated: 10/15/23 0038    Narrative:      The following orders were created for panel order COVID PRE-OP / PRE-PROCEDURE SCREENING ORDER (NO ISOLATION) - Swab, Nasopharynx.  Procedure                               Abnormality         Status                     ---------                               -----------         ------                     COVID-19, FLU A/B, RSV P...[[id number]]  Normal              Final result                 Please view results for these tests on the individual orders.    COVID-19, FLU A/B, RSV PCR - Swab, Nasopharynx [[id number]]  (Normal) Collected: 10/14/23 2311    Specimen: Swab from  Nasopharynx Updated: 10/15/23 0038     COVID19 Not Detected     Influenza A PCR Not Detected     Influenza B PCR Not Detected     RSV, PCR Not Detected    Narrative:      Fact sheet for providers: https://www.fda.gov/media/947430/download    Fact sheet for patients: https://www.fda.gov/media/202958/download    Test performed by PCR.    CBC & Differential [[id number]]  (Abnormal) Collected: 10/14/23 2317    Specimen: Blood Updated: 10/14/23 2323    Narrative:      The following orders were created for panel order CBC & Differential.  Procedure                               Abnormality         Status                     ---------                               -----------         ------                     CBC Auto Differential[[id number]]        Abnormal            Final result                 Please view results for these tests on the individual orders.    CBC Auto Differential [[id number]]  (Abnormal) Collected: 10/14/23 2317    Specimen: Blood Updated: 10/14/23 2323     WBC 5.02 10*3/mm3      RBC 4.22 10*6/mm3      Hemoglobin 14.1 g/dL      Hematocrit 42.0 %      MCV 99.5 fL      MCH 33.4 pg      MCHC 33.6 g/dL      RDW 14.8 %      RDW-SD 54.7 fl      MPV 10.9 fL      Platelets 75 10*3/mm3      Neutrophil % 76.0 %      Lymphocyte % 10.0 %      Monocyte % 11.4 %      Eosinophil % 1.8 %      Basophil % 0.4 %      Immature Grans % 0.4 %      Neutrophils, Absolute 3.82 10*3/mm3      Lymphocytes, Absolute 0.50 10*3/mm3      Monocytes, Absolute 0.57 10*3/mm3      Eosinophils, Absolute 0.09 10*3/mm3      Basophils, Absolute 0.02 10*3/mm3      Immature Grans, Absolute 0.02 10*3/mm3      nRBC 0.0 /100 WBC     Comprehensive Metabolic Panel [[id number]]  (Abnormal) Collected: 10/15/23 0045    Specimen: Blood Updated: 10/15/23 0107     Glucose 170 mg/dL      BUN 10 mg/dL      Creatinine 0.83 mg/dL      Sodium 141 mmol/L      Potassium 3.8 mmol/L      Chloride 110 mmol/L      CO2 22.9 mmol/L      Calcium 8.7 mg/dL      Total  Protein 6.4 g/dL      Albumin 3.5 g/dL      ALT (SGPT) 48 U/L      AST (SGOT) 46 U/L      Alkaline Phosphatase 185 U/L      Total Bilirubin 0.7 mg/dL      Globulin 2.9 gm/dL      A/G Ratio 1.2 g/dL      BUN/Creatinine Ratio 12.0     Anion Gap 8.1 mmol/L      eGFR 107.3 mL/min/1.73     Narrative:      GFR Normal >60  Chronic Kidney Disease <60  Kidney Failure <15               Imaging:    CT Head Without Contrast    Result Date: 10/15/2023  PROCEDURE: CT HEAD WO CONTRAST  COMPARISON: None.  INDICATIONS: FALL. SYNCOPE. HEADACHE.  PROTOCOL:   Standard imaging protocol performed    RADIATION:   Total DLP: 1,970.1 mGy*cm   MA and/or KV were/was adjusted to minimize radiation dose.    TECHNIQUE: After obtaining the patient's consent, 218 CT images were obtained without non-ionic intravenous contrast material.  The study is motion-limited.  FINDINGS: A large amount of acute subarachnoid hemorrhage (SAH) is identified.  The findings are most suggestive of an acutely ruptured cerebral aneurysm.  The definite site of the ruptured aneurysm is not clearly determined by this CT study, but, based upon the amount of acute hemorrhage identified in certain locations, an acutely ruptured basilar tip aneurysm or a ruptured right posterior inferior cerebellar artery (PICA) aneurysm would be top differential considerations.  Mild prominence of the ventricular system is noted, which may represent early communicating hydrocephalus.  No midline shift or definite acute intracranial herniation syndrome is suggested.  No definite acute infarct.  No acute skull fracture is seen.  Age-indeterminate sinus disease is identified, especially involving the sphenoid paranasal sinuses, and particularly the left sphenoid locule.  Please correlate clinically with prior history of trans-sphenoidal surgery.  No definite acute orbital findings are appreciated.  The imaged middle ear clefts (that is, the bilateral mastoid air cell complexes, bilateral  middle ear cavities, and bilateral external auditory canals) are well aerated.        A large volume of acute subarachnoid hemorrhage is seen, which is most suggestive of an acutely ruptured cerebral aneurysm.  The findings are thought less likely to represent acute traumatic (nonaneurysmal) intracranial subarachnoid hemorrhage.  Please correlate clinically.  Please see above comments for further detail.    Pertinent findings were phoned to Dr. Bonilla (ordering/attending [facility] ED Physician) at approximately 0048 hours on 10/14/2023.  Please note that portions of this note were completed with a voice recognition program.  LORNA LISA JR, MD       Electronically Signed and Approved By: LORNA LISA JR, MD on 10/15/2023 at 0:56                Differential Diagnosis and Discussion:    Headache: Differential diagnosis includes but is not limited to migraine, cluster headache, hypertension, tumor, subarachnoid bleeding, pseudotumor cerebri, temporal arteritis, infections, tension headache, and TMJ syndrome.    All labs were reviewed and interpreted by me.  CT scan radiology impression was interpreted by me.    MDM     The patient´s CBC that was reviewed and interpreted by me shows no abnormalities of critical concern. Of note, there is no anemia requiring a blood transfusion and the platelet count is acceptable.  The patient´s CMP that was reviewed and interpretted by me shows no abnormalities of critical concern. Of note, the patient´s sodium and potassium are acceptable. The patient´s liver enzymes are unremarkable. The patient´s renal function (creatinine) is preserved. The patient has a normal anion gap.  CT head does show a subarachnoid hemorrhage.    Critical Care Note: Total Critical Care time of 45 minutes. Total critical care time documented does not include time spent on separately billed procedures for services of nurses or physician assistants. I personally saw and examined the patient. I have reviewed  all diagnostic interpretations and treatment plans as written. I was present for the key portions of any procedures performed and the inclusive time noted in any critical care statement. Critical care time includes patient management by me, time spent at the patients bedside,  time to review lab and imaging results, discussing patient care, documentation in the medical record, and time spent with family or caregiver.    Patient Care Considerations:    None      Consultants/Shared Management Plan:    Case was discussed with Dr. Baez on-call for neurosurgery who recommends transfer.  Case was discussed with Dr. Wang at [facility] who agrees with transfer.    Social Determinants of Health:    Patient is independent, reliable, and has access to care.       Disposition and Care Coordination:    Transferred: Through independent evaluation of the patient's history, physical, and imperical data, the patient meets criteria to be transferred to another hospital for evaluation/admission.        Final diagnoses:   Subarachnoid hematoma with loss of consciousness, initial encounter        ED Disposition       ED Disposition   Transfer to Another Facility     Condition   --    Comment   --               This medical record created using voice recognition software.             Hiral Bonilla MD  10/15/23 0226

## 2023-10-15 NOTE — ED NOTES
Pt states he fell off couch onto elbows. Currently experiencing HA with light and sound sensitivity. 10/10 pain

## 2023-11-10 ENCOUNTER — TELEPHONE (OUTPATIENT)
Dept: PULMONOLOGY | Facility: CLINIC | Age: 48
End: 2023-11-10
Payer: COMMERCIAL

## 2023-11-10 NOTE — TELEPHONE ENCOUNTER
Spoke to patient's wife regarding patient's FMLA/Disability. Yanely states patient passed away in October.
